# Patient Record
Sex: FEMALE | Race: WHITE | URBAN - METROPOLITAN AREA
[De-identification: names, ages, dates, MRNs, and addresses within clinical notes are randomized per-mention and may not be internally consistent; named-entity substitution may affect disease eponyms.]

---

## 2023-12-15 PROBLEM — O36.5990 PREGNANCY AFFECTED BY FETAL GROWTH RESTRICTION (HHS-HCC): Status: ACTIVE | Noted: 2023-12-15

## 2023-12-17 PROBLEM — O36.5990 PREGNANCY AFFECTED BY FETAL GROWTH RESTRICTION (HHS-HCC): Status: RESOLVED | Noted: 2023-12-15 | Resolved: 2023-12-17

## 2024-08-02 PROCEDURE — 87491 CHLMYD TRACH DNA AMP PROBE: CPT

## 2024-08-02 PROCEDURE — 87591 N.GONORRHOEAE DNA AMP PROB: CPT

## 2024-08-06 ENCOUNTER — LAB REQUISITION (OUTPATIENT)
Dept: LAB | Facility: HOSPITAL | Age: 29
End: 2024-08-06
Payer: COMMERCIAL

## 2024-08-06 DIAGNOSIS — Z11.51 ENCOUNTER FOR SCREENING FOR HUMAN PAPILLOMAVIRUS (HPV): ICD-10-CM

## 2024-08-06 DIAGNOSIS — Z12.4 ENCOUNTER FOR SCREENING FOR MALIGNANT NEOPLASM OF CERVIX: ICD-10-CM

## 2024-08-06 DIAGNOSIS — Z34.81 ENCOUNTER FOR SUPERVISION OF OTHER NORMAL PREGNANCY, FIRST TRIMESTER (HHS-HCC): ICD-10-CM

## 2024-08-07 LAB
C TRACH RRNA SPEC QL NAA+PROBE: NEGATIVE
N GONORRHOEA DNA SPEC QL PROBE+SIG AMP: NEGATIVE

## 2024-08-16 LAB
CYTOLOGY CMNT CVX/VAG CYTO-IMP: NORMAL
LAB AP HPV GENOTYPE QUESTION: YES
LAB AP HPV HR: NORMAL
LAB AP PAP ADDITIONAL TESTS: NORMAL
LABORATORY COMMENT REPORT: NORMAL
MENSTRUAL HX REPORTED: NORMAL
PATH REPORT.TOTAL CANCER: NORMAL

## 2024-10-25 DIAGNOSIS — D64.9 ANEMIA, UNSPECIFIED: ICD-10-CM

## 2024-10-25 DIAGNOSIS — O99.019 ANEMIA COMPLICATING PREGNANCY, UNSPECIFIED TRIMESTER (HHS-HCC): ICD-10-CM

## 2024-10-25 DIAGNOSIS — Z34.82 ENCOUNTER FOR SUPERVISION OF OTHER NORMAL PREGNANCY, SECOND TRIMESTER: Primary | ICD-10-CM

## 2024-11-22 DIAGNOSIS — Z34.83 ENCOUNTER FOR SUPERVISION OF OTHER NORMAL PREGNANCY, THIRD TRIMESTER: Primary | ICD-10-CM

## 2025-03-03 ENCOUNTER — PREP FOR PROCEDURE (OUTPATIENT)
Dept: OBSTETRICS AND GYNECOLOGY | Facility: HOSPITAL | Age: 30
End: 2025-03-03

## 2025-03-03 ENCOUNTER — HOSPITAL ENCOUNTER (OUTPATIENT)
Facility: HOSPITAL | Age: 30
Discharge: HOME | End: 2025-03-03
Payer: COMMERCIAL

## 2025-03-03 VITALS
DIASTOLIC BLOOD PRESSURE: 79 MMHG | RESPIRATION RATE: 16 BRPM | TEMPERATURE: 97.5 F | HEIGHT: 65 IN | WEIGHT: 189.2 LBS | BODY MASS INDEX: 31.52 KG/M2 | SYSTOLIC BLOOD PRESSURE: 121 MMHG | OXYGEN SATURATION: 99 % | HEART RATE: 106 BPM

## 2025-03-03 PROCEDURE — 99213 OFFICE O/P EST LOW 20 MIN: CPT

## 2025-03-03 RX ORDER — ONDANSETRON 4 MG/1
4 TABLET, FILM COATED ORAL EVERY 6 HOURS PRN
Status: DISCONTINUED | OUTPATIENT
Start: 2025-03-03 | End: 2025-03-03 | Stop reason: HOSPADM

## 2025-03-03 RX ORDER — LABETALOL HYDROCHLORIDE 5 MG/ML
20 INJECTION, SOLUTION INTRAVENOUS ONCE AS NEEDED
Status: DISCONTINUED | OUTPATIENT
Start: 2025-03-03 | End: 2025-03-03 | Stop reason: HOSPADM

## 2025-03-03 RX ORDER — LIDOCAINE HYDROCHLORIDE 10 MG/ML
0.5 INJECTION, SOLUTION EPIDURAL; INFILTRATION; INTRACAUDAL; PERINEURAL ONCE AS NEEDED
Status: DISCONTINUED | OUTPATIENT
Start: 2025-03-03 | End: 2025-03-03 | Stop reason: HOSPADM

## 2025-03-03 RX ORDER — ONDANSETRON HYDROCHLORIDE 2 MG/ML
4 INJECTION, SOLUTION INTRAVENOUS EVERY 6 HOURS PRN
Status: DISCONTINUED | OUTPATIENT
Start: 2025-03-03 | End: 2025-03-03 | Stop reason: HOSPADM

## 2025-03-03 RX ORDER — HYDRALAZINE HYDROCHLORIDE 20 MG/ML
5 INJECTION INTRAMUSCULAR; INTRAVENOUS ONCE AS NEEDED
Status: DISCONTINUED | OUTPATIENT
Start: 2025-03-03 | End: 2025-03-03 | Stop reason: HOSPADM

## 2025-03-03 ASSESSMENT — PAIN SCALES - GENERAL: PAINLEVEL_OUTOF10: 7

## 2025-03-03 NOTE — H&P
OB Triage H&P    Assessment/Plan    Rinku De is a 29 y.o.  at 38w6d, AYAH: 3/11/2025, by Other Basis, who presents to triage with pelvic pressure.    SVE unchanged from last office exam, toco quiet. Reassuring fetal status. Patient offered observation and repeat SVE vs discharge home and elects the latter. Patient requesting to schedule risk reducing IOL in the event that she does not spontaneously labor, scheduled tomorrow @ 8AM. Return precautions reviewed.     Plan    -Fetal monitoring reassuring  -Good fetal movement  -Up to date on prenatal care  -Continue routine prenatal care    Dispo  -Patient appropriate for discharge home, agrees with plan  -Return precautions discussed   -Follow up for IOL tomorrow or to triage sooner as needed      Subjective   30yo  at 38w6d presents for increased pelvic pressure. Some ctx. Denies LOF/VB. +fetal movement. SVE 2cm in office 3 days ago. H/o  x1, planning TOLAC.     OB History    Para Term  AB Living   6 4 2 2 1 0   SAB IAB Ectopic Multiple Live Births   1 0 0 0 0      # Outcome Date GA Lbr Devante/2nd Weight Sex Type Anes PTL Lv   6 Current            5 SAB            4             3             2 Term            1 Term                No past surgical history on file.    Social History     Tobacco Use    Smoking status: Not on file    Smokeless tobacco: Not on file   Substance Use Topics    Alcohol use: Not on file       Allergies   Allergen Reactions    Prednisone Agitation       Medications Prior to Admission   Medication Sig Dispense Refill Last Dose/Taking    prenatal no115/iron/folic acid (PRENATAL 19 ORAL) Take by mouth once daily.   Past Week     Objective     Last Vitals  Temp Pulse Resp BP MAP O2 Sat   36.4 °C (97.5 °F) 106 16 121/79 95 99 %     Blood Pressures         3/3/2025  1429             BP: 121/79             Physical Exam  General: NAD, mood appropriate  Cardiopulmonary: warm and well perfused,  breathing comfortably on room air  Abdomen: Gravid, non-tender  Extremities: Symmetric  Speculum Exam: deferred  Cervix: 2 /60 /2     Chaperone Present: Yes.  Chaperone Name/Title: RN  Examination Chaperoned: Entire Physical Exam     Fetal Monitoring  Baseline: 140 bpm, Variability: moderate,  Accelerations: present and Decelerations: none  Uterine Activity: No contractions seen on toco  Interpretation: Category 1      Prenatal labs reviewed, not remarkable.

## 2025-03-04 ENCOUNTER — ANESTHESIA EVENT (OUTPATIENT)
Dept: OBSTETRICS AND GYNECOLOGY | Facility: HOSPITAL | Age: 30
End: 2025-03-04
Payer: COMMERCIAL

## 2025-03-04 ENCOUNTER — HOSPITAL ENCOUNTER (INPATIENT)
Facility: HOSPITAL | Age: 30
LOS: 2 days | Discharge: HOME | End: 2025-03-06
Admitting: STUDENT IN AN ORGANIZED HEALTH CARE EDUCATION/TRAINING PROGRAM
Payer: COMMERCIAL

## 2025-03-04 ENCOUNTER — APPOINTMENT (OUTPATIENT)
Dept: OBSTETRICS AND GYNECOLOGY | Facility: HOSPITAL | Age: 30
End: 2025-03-04
Payer: COMMERCIAL

## 2025-03-04 ENCOUNTER — ANESTHESIA (OUTPATIENT)
Dept: OBSTETRICS AND GYNECOLOGY | Facility: HOSPITAL | Age: 30
End: 2025-03-04
Payer: COMMERCIAL

## 2025-03-04 DIAGNOSIS — Z34.90 TERM PREGNANCY (HHS-HCC): Primary | ICD-10-CM

## 2025-03-04 LAB
ABO GROUP (TYPE) IN BLOOD: NORMAL
ABO GROUP (TYPE) IN BLOOD: NORMAL
ANTIBODY SCREEN: NORMAL
ERYTHROCYTE [DISTWIDTH] IN BLOOD BY AUTOMATED COUNT: 20 % (ref 11.5–14.5)
HCT VFR BLD AUTO: 29.6 % (ref 36–46)
HGB BLD-MCNC: 8.5 G/DL (ref 12–16)
MCH RBC QN AUTO: 20 PG (ref 26–34)
MCHC RBC AUTO-ENTMCNC: 28.7 G/DL (ref 32–36)
MCV RBC AUTO: 70 FL (ref 80–100)
NRBC BLD-RTO: 0 /100 WBCS (ref 0–0)
PLATELET # BLD AUTO: 363 X10*3/UL (ref 150–450)
RBC # BLD AUTO: 4.26 X10*6/UL (ref 4–5.2)
RH FACTOR (ANTIGEN D): NORMAL
RH FACTOR (ANTIGEN D): NORMAL
TREPONEMA PALLIDUM IGG+IGM AB [PRESENCE] IN SERUM OR PLASMA BY IMMUNOASSAY: NONREACTIVE
WBC # BLD AUTO: 8.5 X10*3/UL (ref 4.4–11.3)

## 2025-03-04 PROCEDURE — 1220000001 HC OB SEMI-PRIVATE ROOM DAILY

## 2025-03-04 PROCEDURE — 2500000004 HC RX 250 GENERAL PHARMACY W/ HCPCS (ALT 636 FOR OP/ED): Performed by: NURSE ANESTHETIST, CERTIFIED REGISTERED

## 2025-03-04 PROCEDURE — 59050 FETAL MONITOR W/REPORT: CPT

## 2025-03-04 PROCEDURE — 59409 OBSTETRICAL CARE: CPT

## 2025-03-04 PROCEDURE — 86850 RBC ANTIBODY SCREEN: CPT | Performed by: STUDENT IN AN ORGANIZED HEALTH CARE EDUCATION/TRAINING PROGRAM

## 2025-03-04 PROCEDURE — 3700000014 EPIDURAL BLOCK: Performed by: NURSE ANESTHETIST, CERTIFIED REGISTERED

## 2025-03-04 PROCEDURE — 36415 COLL VENOUS BLD VENIPUNCTURE: CPT | Performed by: STUDENT IN AN ORGANIZED HEALTH CARE EDUCATION/TRAINING PROGRAM

## 2025-03-04 PROCEDURE — 01967 NEURAXL LBR ANES VAG DLVR: CPT | Performed by: NURSE ANESTHETIST, CERTIFIED REGISTERED

## 2025-03-04 PROCEDURE — 2500000004 HC RX 250 GENERAL PHARMACY W/ HCPCS (ALT 636 FOR OP/ED): Performed by: STUDENT IN AN ORGANIZED HEALTH CARE EDUCATION/TRAINING PROGRAM

## 2025-03-04 PROCEDURE — 85027 COMPLETE CBC AUTOMATED: CPT | Performed by: STUDENT IN AN ORGANIZED HEALTH CARE EDUCATION/TRAINING PROGRAM

## 2025-03-04 PROCEDURE — 7210000002 HC LABOR PER HOUR

## 2025-03-04 PROCEDURE — 10907ZC DRAINAGE OF AMNIOTIC FLUID, THERAPEUTIC FROM PRODUCTS OF CONCEPTION, VIA NATURAL OR ARTIFICIAL OPENING: ICD-10-PCS

## 2025-03-04 PROCEDURE — 3E033VJ INTRODUCTION OF OTHER HORMONE INTO PERIPHERAL VEIN, PERCUTANEOUS APPROACH: ICD-10-PCS

## 2025-03-04 PROCEDURE — 86901 BLOOD TYPING SEROLOGIC RH(D): CPT | Performed by: STUDENT IN AN ORGANIZED HEALTH CARE EDUCATION/TRAINING PROGRAM

## 2025-03-04 PROCEDURE — 86780 TREPONEMA PALLIDUM: CPT | Mod: TRILAB | Performed by: STUDENT IN AN ORGANIZED HEALTH CARE EDUCATION/TRAINING PROGRAM

## 2025-03-04 PROCEDURE — 86923 COMPATIBILITY TEST ELECTRIC: CPT

## 2025-03-04 RX ORDER — LOPERAMIDE HYDROCHLORIDE 2 MG/1
4 CAPSULE ORAL EVERY 2 HOUR PRN
Status: DISCONTINUED | OUTPATIENT
Start: 2025-03-04 | End: 2025-03-06 | Stop reason: HOSPADM

## 2025-03-04 RX ORDER — MISOPROSTOL 200 UG/1
800 TABLET ORAL ONCE AS NEEDED
Status: DISCONTINUED | OUTPATIENT
Start: 2025-03-04 | End: 2025-03-06 | Stop reason: HOSPADM

## 2025-03-04 RX ORDER — BISACODYL 10 MG/1
10 SUPPOSITORY RECTAL DAILY PRN
Status: DISCONTINUED | OUTPATIENT
Start: 2025-03-04 | End: 2025-03-06 | Stop reason: HOSPADM

## 2025-03-04 RX ORDER — LABETALOL HYDROCHLORIDE 5 MG/ML
20 INJECTION, SOLUTION INTRAVENOUS ONCE AS NEEDED
Status: DISCONTINUED | OUTPATIENT
Start: 2025-03-04 | End: 2025-03-06 | Stop reason: HOSPADM

## 2025-03-04 RX ORDER — OXYTOCIN 10 [USP'U]/ML
10 INJECTION, SOLUTION INTRAMUSCULAR; INTRAVENOUS ONCE AS NEEDED
Status: DISCONTINUED | OUTPATIENT
Start: 2025-03-04 | End: 2025-03-06 | Stop reason: HOSPADM

## 2025-03-04 RX ORDER — OXYTOCIN/0.9 % SODIUM CHLORIDE 30/500 ML
60 PLASTIC BAG, INJECTION (ML) INTRAVENOUS ONCE AS NEEDED
Status: DISCONTINUED | OUTPATIENT
Start: 2025-03-04 | End: 2025-03-06 | Stop reason: HOSPADM

## 2025-03-04 RX ORDER — METHYLERGONOVINE MALEATE 0.2 MG/ML
0.2 INJECTION INTRAVENOUS ONCE AS NEEDED
Status: DISCONTINUED | OUTPATIENT
Start: 2025-03-04 | End: 2025-03-06 | Stop reason: HOSPADM

## 2025-03-04 RX ORDER — SIMETHICONE 80 MG
80 TABLET,CHEWABLE ORAL 4 TIMES DAILY PRN
Status: DISCONTINUED | OUTPATIENT
Start: 2025-03-04 | End: 2025-03-06 | Stop reason: HOSPADM

## 2025-03-04 RX ORDER — IBUPROFEN 600 MG/1
600 TABLET ORAL EVERY 6 HOURS SCHEDULED
Status: DISCONTINUED | OUTPATIENT
Start: 2025-03-05 | End: 2025-03-06 | Stop reason: HOSPADM

## 2025-03-04 RX ORDER — FENTANYL/ROPIVACAINE/NS/PF 2MCG/ML-.2
0-25 PLASTIC BAG, INJECTION (ML) INJECTION CONTINUOUS
Status: DISCONTINUED | OUTPATIENT
Start: 2025-03-04 | End: 2025-03-06 | Stop reason: HOSPADM

## 2025-03-04 RX ORDER — SODIUM CHLORIDE, SODIUM LACTATE, POTASSIUM CHLORIDE, CALCIUM CHLORIDE 600; 310; 30; 20 MG/100ML; MG/100ML; MG/100ML; MG/100ML
75 INJECTION, SOLUTION INTRAVENOUS CONTINUOUS
Status: ACTIVE | OUTPATIENT
Start: 2025-03-04 | End: 2025-03-05

## 2025-03-04 RX ORDER — ACETAMINOPHEN 325 MG/1
975 TABLET ORAL EVERY 6 HOURS SCHEDULED
Status: DISCONTINUED | OUTPATIENT
Start: 2025-03-05 | End: 2025-03-06 | Stop reason: HOSPADM

## 2025-03-04 RX ORDER — ONDANSETRON 4 MG/1
4 TABLET, FILM COATED ORAL EVERY 6 HOURS PRN
Status: DISCONTINUED | OUTPATIENT
Start: 2025-03-04 | End: 2025-03-06 | Stop reason: HOSPADM

## 2025-03-04 RX ORDER — TERBUTALINE SULFATE 1 MG/ML
0.25 INJECTION SUBCUTANEOUS ONCE AS NEEDED
Status: DISCONTINUED | OUTPATIENT
Start: 2025-03-04 | End: 2025-03-06 | Stop reason: HOSPADM

## 2025-03-04 RX ORDER — LIDOCAINE HYDROCHLORIDE 10 MG/ML
30 INJECTION, SOLUTION INFILTRATION; PERINEURAL ONCE AS NEEDED
Status: DISCONTINUED | OUTPATIENT
Start: 2025-03-04 | End: 2025-03-06 | Stop reason: HOSPADM

## 2025-03-04 RX ORDER — HYDRALAZINE HYDROCHLORIDE 20 MG/ML
5 INJECTION INTRAMUSCULAR; INTRAVENOUS ONCE AS NEEDED
Status: DISCONTINUED | OUTPATIENT
Start: 2025-03-04 | End: 2025-03-06 | Stop reason: HOSPADM

## 2025-03-04 RX ORDER — TRANEXAMIC ACID 10 MG/ML
1000 INJECTION, SOLUTION INTRAVENOUS ONCE AS NEEDED
Status: DISCONTINUED | OUTPATIENT
Start: 2025-03-04 | End: 2025-03-06 | Stop reason: HOSPADM

## 2025-03-04 RX ORDER — CARBOPROST TROMETHAMINE 250 UG/ML
250 INJECTION, SOLUTION INTRAMUSCULAR ONCE AS NEEDED
Status: DISCONTINUED | OUTPATIENT
Start: 2025-03-04 | End: 2025-03-06 | Stop reason: HOSPADM

## 2025-03-04 RX ORDER — OXYTOCIN/0.9 % SODIUM CHLORIDE 30/500 ML
2-30 PLASTIC BAG, INJECTION (ML) INTRAVENOUS CONTINUOUS
Status: DISCONTINUED | OUTPATIENT
Start: 2025-03-04 | End: 2025-03-06 | Stop reason: HOSPADM

## 2025-03-04 RX ORDER — NIFEDIPINE 10 MG/1
10 CAPSULE ORAL ONCE AS NEEDED
Status: DISCONTINUED | OUTPATIENT
Start: 2025-03-04 | End: 2025-03-06 | Stop reason: HOSPADM

## 2025-03-04 RX ORDER — TRANEXAMIC ACID 10 MG/ML
1000 INJECTION, SOLUTION INTRAVENOUS ONCE AS NEEDED
Status: ACTIVE | OUTPATIENT
Start: 2025-03-04 | End: 2025-03-04

## 2025-03-04 RX ORDER — ONDANSETRON HYDROCHLORIDE 2 MG/ML
4 INJECTION, SOLUTION INTRAVENOUS EVERY 6 HOURS PRN
Status: DISCONTINUED | OUTPATIENT
Start: 2025-03-04 | End: 2025-03-06 | Stop reason: HOSPADM

## 2025-03-04 RX ORDER — DOCUSATE SODIUM 100 MG/1
100 CAPSULE, LIQUID FILLED ORAL 2 TIMES DAILY PRN
Status: DISCONTINUED | OUTPATIENT
Start: 2025-03-04 | End: 2025-03-06 | Stop reason: HOSPADM

## 2025-03-04 RX ORDER — ADHESIVE BANDAGE
10 BANDAGE TOPICAL
Status: DISCONTINUED | OUTPATIENT
Start: 2025-03-04 | End: 2025-03-06 | Stop reason: HOSPADM

## 2025-03-04 RX ADMIN — SODIUM CHLORIDE, SODIUM LACTATE, POTASSIUM CHLORIDE, AND CALCIUM CHLORIDE 500 ML: 600; 310; 30; 20 INJECTION, SOLUTION INTRAVENOUS at 17:51

## 2025-03-04 RX ADMIN — Medication 3 ML: at 18:08

## 2025-03-04 RX ADMIN — SODIUM CHLORIDE, SODIUM LACTATE, POTASSIUM CHLORIDE, AND CALCIUM CHLORIDE 75 ML/HR: 600; 310; 30; 20 INJECTION, SOLUTION INTRAVENOUS at 09:05

## 2025-03-04 RX ADMIN — Medication 10 ML/HR: at 18:10

## 2025-03-04 RX ADMIN — Medication 2 MILLI-UNITS/MIN: at 09:38

## 2025-03-04 RX ADMIN — SODIUM CHLORIDE, POTASSIUM CHLORIDE, SODIUM LACTATE AND CALCIUM CHLORIDE 500 ML: 600; 310; 30; 20 INJECTION, SOLUTION INTRAVENOUS at 20:40

## 2025-03-04 RX ADMIN — SODIUM CHLORIDE, SODIUM LACTATE, POTASSIUM CHLORIDE, AND CALCIUM CHLORIDE 75 ML/HR: 600; 310; 30; 20 INJECTION, SOLUTION INTRAVENOUS at 16:12

## 2025-03-04 SDOH — SOCIAL STABILITY: SOCIAL INSECURITY: WITHIN THE LAST YEAR, HAVE YOU BEEN AFRAID OF YOUR PARTNER OR EX-PARTNER?: NO

## 2025-03-04 SDOH — ECONOMIC STABILITY: FOOD INSECURITY: WITHIN THE PAST 12 MONTHS, YOU WORRIED THAT YOUR FOOD WOULD RUN OUT BEFORE YOU GOT THE MONEY TO BUY MORE.: NEVER TRUE

## 2025-03-04 SDOH — ECONOMIC STABILITY: FOOD INSECURITY: WITHIN THE PAST 12 MONTHS, THE FOOD YOU BOUGHT JUST DIDN'T LAST AND YOU DIDN'T HAVE MONEY TO GET MORE.: NEVER TRUE

## 2025-03-04 SDOH — HEALTH STABILITY: MENTAL HEALTH: HAVE YOU USED ANY SUBSTANCES (CANABIS, COCAINE, HEROIN, HALLUCINOGENS, INHALANTS, ETC.) IN THE PAST 12 MONTHS?: NO

## 2025-03-04 SDOH — SOCIAL STABILITY: SOCIAL INSECURITY: WITHIN THE LAST YEAR, HAVE YOU BEEN HUMILIATED OR EMOTIONALLY ABUSED IN OTHER WAYS BY YOUR PARTNER OR EX-PARTNER?: NO

## 2025-03-04 SDOH — SOCIAL STABILITY: SOCIAL INSECURITY
WITHIN THE LAST YEAR, HAVE YOU BEEN KICKED, HIT, SLAPPED, OR OTHERWISE PHYSICALLY HURT BY YOUR PARTNER OR EX-PARTNER?: NO

## 2025-03-04 SDOH — HEALTH STABILITY: MENTAL HEALTH: SUICIDAL BEHAVIOR (LIFETIME): NO

## 2025-03-04 SDOH — HEALTH STABILITY: MENTAL HEALTH: NON-SPECIFIC ACTIVE SUICIDAL THOUGHTS (PAST 1 MONTH): NO

## 2025-03-04 SDOH — SOCIAL STABILITY: SOCIAL INSECURITY: VERBAL ABUSE: DENIES

## 2025-03-04 SDOH — HEALTH STABILITY: MENTAL HEALTH: WISH TO BE DEAD (PAST 1 MONTH): NO

## 2025-03-04 SDOH — ECONOMIC STABILITY: FOOD INSECURITY: HOW HARD IS IT FOR YOU TO PAY FOR THE VERY BASICS LIKE FOOD, HOUSING, MEDICAL CARE, AND HEATING?: NOT HARD AT ALL

## 2025-03-04 SDOH — SOCIAL STABILITY: SOCIAL INSECURITY
WITHIN THE LAST YEAR, HAVE YOU BEEN RAPED OR FORCED TO HAVE ANY KIND OF SEXUAL ACTIVITY BY YOUR PARTNER OR EX-PARTNER?: NO

## 2025-03-04 SDOH — ECONOMIC STABILITY: HOUSING INSECURITY: DO YOU FEEL UNSAFE GOING BACK TO THE PLACE WHERE YOU ARE LIVING?: NO

## 2025-03-04 SDOH — SOCIAL STABILITY: SOCIAL INSECURITY: HAVE YOU HAD ANY THOUGHTS OF HARMING ANYONE ELSE?: NO

## 2025-03-04 SDOH — SOCIAL STABILITY: SOCIAL INSECURITY: HAVE YOU HAD THOUGHTS OF HARMING ANYONE ELSE?: NO

## 2025-03-04 SDOH — SOCIAL STABILITY: SOCIAL INSECURITY: ABUSE SCREEN: ADULT

## 2025-03-04 SDOH — ECONOMIC STABILITY: TRANSPORTATION INSECURITY: IN THE PAST 12 MONTHS, HAS LACK OF TRANSPORTATION KEPT YOU FROM MEDICAL APPOINTMENTS OR FROM GETTING MEDICATIONS?: NO

## 2025-03-04 SDOH — SOCIAL STABILITY: SOCIAL INSECURITY: DO YOU FEEL ANYONE HAS EXPLOITED OR TAKEN ADVANTAGE OF YOU FINANCIALLY OR OF YOUR PERSONAL PROPERTY?: NO

## 2025-03-04 SDOH — SOCIAL STABILITY: SOCIAL INSECURITY: ARE THERE ANY APPARENT SIGNS OF INJURIES/BEHAVIORS THAT COULD BE RELATED TO ABUSE/NEGLECT?: NO

## 2025-03-04 SDOH — SOCIAL STABILITY: SOCIAL INSECURITY: HAS ANYONE EVER THREATENED TO HURT YOUR FAMILY OR YOUR PETS?: NO

## 2025-03-04 SDOH — SOCIAL STABILITY: SOCIAL INSECURITY: DOES ANYONE TRY TO KEEP YOU FROM HAVING/CONTACTING OTHER FRIENDS OR DOING THINGS OUTSIDE YOUR HOME?: NO

## 2025-03-04 SDOH — SOCIAL STABILITY: SOCIAL INSECURITY: ARE YOU OR HAVE YOU BEEN THREATENED OR ABUSED PHYSICALLY, EMOTIONALLY, OR SEXUALLY BY ANYONE?: NO

## 2025-03-04 SDOH — HEALTH STABILITY: MENTAL HEALTH: HAVE YOU USED ANY PRESCRIPTION DRUGS OTHER THAN PRESCRIBED IN THE PAST 12 MONTHS?: NO

## 2025-03-04 SDOH — HEALTH STABILITY: MENTAL HEALTH: WERE YOU ABLE TO COMPLETE ALL THE BEHAVIORAL HEALTH SCREENINGS?: YES

## 2025-03-04 SDOH — SOCIAL STABILITY: SOCIAL INSECURITY: PHYSICAL ABUSE: DENIES

## 2025-03-04 SDOH — HEALTH STABILITY: MENTAL HEALTH: CURRENT SMOKER: 0

## 2025-03-04 ASSESSMENT — PAIN SCALES - GENERAL
PAINLEVEL_OUTOF10: 0 - NO PAIN
PAIN_LEVEL: 0
PAINLEVEL_OUTOF10: 0 - NO PAIN

## 2025-03-04 ASSESSMENT — PATIENT HEALTH QUESTIONNAIRE - PHQ9
1. LITTLE INTEREST OR PLEASURE IN DOING THINGS: NOT AT ALL
2. FEELING DOWN, DEPRESSED OR HOPELESS: NOT AT ALL
SUM OF ALL RESPONSES TO PHQ9 QUESTIONS 1 & 2: 0

## 2025-03-04 ASSESSMENT — LIFESTYLE VARIABLES
SKIP TO QUESTIONS 9-10: 1
HOW OFTEN DO YOU HAVE 6 OR MORE DRINKS ON ONE OCCASION: NEVER
AUDIT-C TOTAL SCORE: 0
HOW MANY STANDARD DRINKS CONTAINING ALCOHOL DO YOU HAVE ON A TYPICAL DAY: PATIENT DOES NOT DRINK
AUDIT-C TOTAL SCORE: 0
HOW OFTEN DO YOU HAVE A DRINK CONTAINING ALCOHOL: NEVER

## 2025-03-04 ASSESSMENT — ACTIVITIES OF DAILY LIVING (ADL)
LACK_OF_TRANSPORTATION: NO
LACK_OF_TRANSPORTATION: NO

## 2025-03-04 NOTE — CARE PLAN
The patient's goals for the shift include      The clinical goals for the shift include safe vaginal delivery of baby boy      Problem: Vaginal Birth or  Section  Goal: Fetal and maternal status remain reassuring during the birth process  Outcome: Progressing  Goal: Prevention of malpresentation/labor dystocia through delivery  Outcome: Progressing  Goal: Demonstrates labor coping techniques through delivery  Outcome: Progressing  Goal: Minimal s/sx of HDP and BP<160/110  Outcome: Progressing  Goal: No s/sx of infection through recovery  Outcome: Progressing  Goal: No s/sx of hemorrhage through recovery  Outcome: Progressing     Problem: Pain - Adult  Goal: Verbalizes/displays adequate comfort level or baseline comfort level  Outcome: Progressing     Problem: Safety - Adult  Goal: Free from fall injury  Outcome: Progressing     Problem: Discharge Planning  Goal: Discharge to home or other facility with appropriate resources  Outcome: Progressing

## 2025-03-04 NOTE — ANESTHESIA PROCEDURE NOTES
Epidural Block    Patient location during procedure: OB  Start time: 3/4/2025 5:55 PM  End time: 3/4/2025 6:13 PM  Reason for block: labor analgesia  Staffing  Performed: CRNA   Authorized by: WESTLEY Garcia    Performed by: WESTLEY Garcia    Preanesthetic Checklist  Completed: patient identified, IV checked, risks and benefits discussed, surgical consent, pre-op evaluation, timeout performed and sterile techniques followed  Block Timeout  RN/Licensed healthcare professional reads aloud to the Anesthesia provider and entire team: Patient identity, procedure with side and site, patient position, and as applicable the availability of implants/special equipment/special requirements.  Patient on coagulant treatment: no  Timeout performed at: 3/4/2025 5:57 PM  Block Placement  Patient position: sitting  Prep: ChloraPrep  Sterility prep: mask, gloves and cap  Sedation level: no sedation  Patient monitoring: heart rate, continuous pulse oximetry and blood pressure  Approach: midline  Local numbing: lidocaine 1% to skin and subcutaneous tissues  Vertebral space: lumbar  Lumbar location: L3-L4  Epidural  Loss of resistance technique: air  Guidance: landmark technique        Needle  Needle type: Tuohy   Needle gauge: 17  Needle length: 10.2 cm  Needle insertion depth: 5 cm  Catheter type: multi-orifice  Catheter at skin depth: 12 cm  Catheter securement method: clear occlusive dressing    Test dose: lidocaine 1.5% with epinephrine 1-to-200,000  Test dose: lidocaine 1.5% with epinephrine 1-to-200,000  Test dose result: no positive test dose    PCEA  Medication concentration used: 0.2% Ropivacaine with 2 mcg/mL Fentanyl  Dose (mL): 5  Lockout (minutes): 20  1-Hour Limit (boluses/hr): 25  Basal Rate: 10        Assessment  Block outcome: pain improved  Number of attempts: 1  Events: no positive test dose  Procedure assessment: patient tolerated procedure well with no immediate complications

## 2025-03-04 NOTE — H&P
OB Admission H&P    Assessment/Plan    Rinku De is a 29 y.o.  at 39w0d, AYAH: 3/11/2025, by Other Basis, who presents for Induction of Labor.    Plan  -Admit to L&D, consented  -Patient desires trial of labor after . Risks, benefits, and alternatives reviewed. MFMU  success calculator = 84%.  -CBC and Syphilis  -Type & cross 2 units  -Epidural at patient request  -Recheck as clinically indicated by maternal or fetal status  -Plan to initiate induction with pitocin    Fetal Status  -NST reactive, reassuring   -Presentation cephalic based on vaginal exam  -EFW 19%ile by US on 25  -GBS negative      Pregnancy Problems (from 25 to present)       Problem Noted Diagnosed Resolved    Term pregnancy ([facility]) 3/4/2025 by Kate Gonzales MD  No    Priority:  Medium               Subjective   28yo  at 39w0d presents for scheduled induction of labor.     OB History    Para Term  AB Living   6 4 2 2 1 5   SAB IAB Ectopic Multiple Live Births   1 0 0 0 4      # Outcome Date GA Lbr Devante/2nd Weight Sex Type Anes PTL Lv   6 Current            5 SAB            4             3             2 Term            1 Term                History reviewed. No pertinent surgical history.    Social History     Tobacco Use    Smoking status: Never    Smokeless tobacco: Never   Substance Use Topics    Alcohol use: Not Currently       Allergies   Allergen Reactions    Prednisone Agitation       Medications Prior to Admission   Medication Sig Dispense Refill Last Dose/Taking    prenatal no115/iron/folic acid (PRENATAL 19 ORAL) Take by mouth once daily.   Past Week     Objective     Last Vitals  Temp Pulse Resp BP MAP O2 Sat   36.4 °C (97.5 °F) 91 16 121/75 93 100 %     Blood Pressures         3/4/2025  0814             BP: 121/75             Physical Exam  General: NAD, mood appropriate  Cardiopulmonary: warm and well perfused, breathing comfortably on room air  Abdomen:  Gravid, non-tender  Extremities: Symmetric  Speculum Exam: deferred  Cervix: 2/60/-2 in triage last night       Fetal Monitoring  Baseline: 140 bpm, Variability: moderate,  Accelerations: present and Decelerations: none  Uterine Activity: No contractions seen on toco  Interpretation: Category 1    Labs in chart were reviewed.

## 2025-03-04 NOTE — ANESTHESIA PREPROCEDURE EVALUATION
Patient: Rinku De    Evaluation Method: In-person visit    Procedure Information    Date: 25  Procedure: Labor Analgesia         Relevant Problems   Anesthesia (within normal limits)      Cardiac (within normal limits)      Pulmonary (within normal limits)      Neuro (within normal limits)      GI (within normal limits)      /Renal (within normal limits)      Liver (within normal limits)      Endocrine (within normal limits)      Hematology (within normal limits)      Musculoskeletal (within normal limits)      HEENT (within normal limits)      ID (within normal limits)      Skin (within normal limits)      GYN (within normal limits)      Gravid and    (+) Term pregnancy ([facility])       Clinical information reviewed:   Tobacco  Allergies  Meds   Med Hx  Surg Hx   Fam Hx          NPO Detail:  NPO/Void Status  Date of Last Liquid: 25  Time of Last Liquid:   Date of Last Solid: 25  Time of Last Solid:          OB/Gyn Evaluation    Present Pregnancy    Patient is pregnant now.  (+) , trial of labor after    Obstetric History                Physical Exam    Airway  Mallampati: II  TM distance: >3 FB     Cardiovascular - normal exam     Dental - normal exam     Pulmonary - normal exam     Abdominal - normal exam             Anesthesia Plan    History of general anesthesia?: no  History of complications of general anesthesia?: no    ASA 2     epidural     The patient is not a current smoker.    Anesthetic plan and risks discussed with patient.  Use of blood products discussed with patient who consented to blood products.

## 2025-03-05 PROCEDURE — 7100000016 HC LABOR RECOVERY PER HOUR

## 2025-03-05 PROCEDURE — 2500000001 HC RX 250 WO HCPCS SELF ADMINISTERED DRUGS (ALT 637 FOR MEDICARE OP)

## 2025-03-05 PROCEDURE — 1220000001 HC OB SEMI-PRIVATE ROOM DAILY

## 2025-03-05 PROCEDURE — 51701 INSERT BLADDER CATHETER: CPT

## 2025-03-05 RX ADMIN — ACETAMINOPHEN 975 MG: 325 TABLET, FILM COATED ORAL at 19:58

## 2025-03-05 RX ADMIN — ACETAMINOPHEN 975 MG: 325 TABLET, FILM COATED ORAL at 05:38

## 2025-03-05 RX ADMIN — IBUPROFEN 600 MG: 600 TABLET, FILM COATED ORAL at 05:38

## 2025-03-05 RX ADMIN — IBUPROFEN 600 MG: 600 TABLET, FILM COATED ORAL at 19:59

## 2025-03-05 ASSESSMENT — PAIN DESCRIPTION - LOCATION
LOCATION: ABDOMEN
LOCATION: ABDOMEN

## 2025-03-05 ASSESSMENT — PAIN SCALES - GENERAL
PAINLEVEL_OUTOF10: 6
PAINLEVEL_OUTOF10: 2
PAINLEVEL_OUTOF10: 0 - NO PAIN
PAINLEVEL_OUTOF10: 0 - NO PAIN
PAINLEVEL_OUTOF10: 6
PAINLEVEL_OUTOF10: 9
PAINLEVEL_OUTOF10: 5 - MODERATE PAIN

## 2025-03-05 NOTE — CARE PLAN
The clinical goals for the shift include pain management    Problem: Pain - Adult  Goal: Verbalizes/displays adequate comfort level or baseline comfort level  Outcome: Progressing     Problem: Safety - Adult  Goal: Free from fall injury  Outcome: Progressing     Problem: Discharge Planning  Goal: Discharge to home or other facility with appropriate resources  Outcome: Progressing     Problem: Vaginal Birth or  Section  Goal: Fetal and maternal status remain reassuring during the birth process  Outcome: Met  Goal: Prevention of malpresentation/labor dystocia through delivery  Outcome: Met  Goal: Demonstrates labor coping techniques through delivery  Outcome: Met  Goal: Minimal s/sx of HDP and BP<160/110  Outcome: Met  Goal: No s/sx of infection through recovery  Outcome: Met  Goal: No s/sx of hemorrhage through recovery  Outcome: Met

## 2025-03-05 NOTE — ANESTHESIA POSTPROCEDURE EVALUATION
Patient: Rinku De    Procedure Summary       Date: 03/04/25 Room / Location:     Anesthesia Start: 1755 Anesthesia Stop: 1952    Procedure: Labor Analgesia Diagnosis:     Scheduled Providers:  Responsible Provider: WESTLEY Garcia    Anesthesia Type: epidural ASA Status: 2            Anesthesia Type: epidural    Vitals Value Taken Time   /78 03/04/25 2216   Temp 36.8 03/04/25 2216   Pulse 91 03/04/25 2216   Resp 16 03/04/25 2216   SpO2 98 03/04/25 2216       Anesthesia Post Evaluation    Patient location during evaluation: bedside  Patient participation: complete - patient participated  Level of consciousness: awake and alert  Pain score: 0  Pain management: adequate  Airway patency: patent  Cardiovascular status: acceptable  Respiratory status: acceptable  Hydration status: acceptable  Postoperative Nausea and Vomiting: none        There were no known notable events for this encounter.

## 2025-03-05 NOTE — LACTATION NOTE
Lactation Consultant Note      Met with mother. Mother is an experienced breastfeeder. Mother  all of her other children. Mother stated breastfeeding is going well and denies any pain. Mother denies needing any help. Mother denies needing any reviews on breastfeeding education. Continuing support offered as needed.

## 2025-03-05 NOTE — L&D DELIVERY NOTE
OB Delivery Note  3/4/2025  Rinku De  29 y.o.   Vaginal, Spontaneous      viable male infant over intact perineum  Spontaneous cry at delivery    Placenta spontaneously delivered, 3VC, intact.  EBL 150cc  Bimanual exploration of uterus - intact    Complications -none    Gestational Age: 39w0d  /Para:   Quantitative Blood Loss: Admission to Discharge: 0 mL (3/4/2025  7:57 AM - 3/4/2025 10:05 PM)    Lennox De [[id number]]      Labor Events    Labor type: Induced Onset of Labor  Labor allowed to proceed with plans for an attempted vaginal birth?: Yes  Induction: Oxytocin  Induction indications: Risk Reducing  Complications: None       Placenta    Placenta delivery date/time:   Placenta removal: Expressed  Placenta appearance: Intact  Placenta disposition: discarded       Cord    Vessels: 3 vessels  Complications: None  Delayed cord clamping?: No  Cord blood disposition: Discarded  Gases sent?: No  Stem cell collection (by provider): No       Lacerations    Episiotomy: None  Perineal laceration: None  Other lacerations?: No  Repair suture: None        Delivery    Birth date/time:   Delivery type: Vaginal, Spontaneous  Complications: None       Apgars    Living status:   Apgar Component Scores:  1 min.:  5 min.:  10 min.:  15 min.:  20 min.:    Skin color:         Heart rate:         Reflex irritability:         Muscle tone:         Respiratory effort:         Total:                Delivery Providers    Delivering clinician:    Provider Role     Delivery Nurse     Nursery Nurse     Resident                 Intrauterine Device Inserted : No,      Jesusita De La Torre MD

## 2025-03-05 NOTE — PROGRESS NOTES
Postpartum Progress Note    Assessment/Plan   Rinku De is a 29 y.o., , who delivered at 39w0d gestation and is now postpartum day 1.    Continue current care    Assessment & Plan  Term pregnancy ([facility])    Pregnancy Problems (from 25 to present)       Problem Noted Diagnosed Resolved    Term pregnancy ([facility]) 3/4/2025 by Kate Gonzales MD  No    Priority:  Medium             Hospital course: no complications      Subjective   Her pain is well controlled with current medications    She is ambulating well  She is tolerating a Adult diet Regular  She reports no breast or nursing problems  She denies emotional concerns today   Her plan for contraception is none         Objective   Allergies:   Prednisone         Last Vitals:  Temp Pulse Resp BP MAP Pulse Ox   36.5 °C (97.7 °F) (!) 50 20 118/67 86 100 %     Vitals Min/Max Last 24 Hours:  Temp  Min: 36.5 °C (97.7 °F)  Max: 37.5 °C (99.5 °F)  Pulse  Min: 47  Max: 110  Resp  Min: 16  Max: 20  BP  Min: 97/60  Max: 126/81  MAP (mmHg)  Min: 73  Max: 102    Intake/Output:     Intake/Output Summary (Last 24 hours) at 3/5/2025 0828  Last data filed at 3/5/2025 0459  Gross per 24 hour   Intake 1047 ml   Output 1055 ml   Net -8 ml       Physical Exam:  General: Examination reveals a well developed, well nourished, female, in no acute distress. She is alert and cooperative.  Abdomen: soft, gravid, nontender, nondistended, no abnormal masses, no epigastric pain.  Fundus: firm and below umbilicus.  Perineum: minimal bleeding.  Extremities: no redness or tenderness in the calves or thighs.  Neurological: alert, oriented, normal speech, no focal findings or movement disorder noted.  Psychological: awake and alert; oriented to person, place, and time.    Chaperone Present: Declined.  Chaperone Name/Title:   Examination Chaperoned:     Lab Data:

## 2025-03-05 NOTE — PROGRESS NOTES
Intrapartum Progress Note    Assessment/Plan   Rinku De is a 29 y.o.  at 39w0d. AYAH: 3/11/2025, by Other Basis.     Amniotomy - no complications  Continue pitocin per protocol    Assessment & Plan  Term pregnancy ([facility])    Pregnancy Problems (from 25 to present)       Problem Noted Diagnosed Resolved    Term pregnancy ([facility]) 3/4/2025 by Kate Gonzales MD  No    Priority:  Medium               Subjective   Comfortable with epidural    Objective   Last Vitals:  Temp Pulse Resp BP MAP Pulse Ox   37.1 °C (98.8 °F) 91 16 97/60 73 100 %     Vitals Min/Max Last 24 Hours:  Temp  Min: 36.4 °C (97.5 °F)  Max: 37.5 °C (99.5 °F)  Pulse  Min: 65  Max: 93  Resp  Min: 16  Max: 16  BP  Min: 97/60  Max: 126/79  MAP (mmHg)  Min: 73  Max: 102    Intake/Output:    Intake/Output Summary (Last 24 hours) at 3/4/2025 2018  Last data filed at 3/4/2025 1612  Gross per 24 hour   Intake 1047 ml   Output 100 ml   Net 947 ml       Physical Examination:  GENERAL: Examination reveals a well developed, well nourished, gravid female in no acute distress. She is alert and cooperative.  FHR is normal , with accels, and a cat 1  tracing.    Obetz reading: +contractions, pitocin at 6mu/min (prior 14mu/min but tachsystole - increase pitocin per protocol now   CERVIX: 4 cm dilated, 80 % effaced, -2 station; MEMBRANES are Intact    Chaperone Present: Declined.  Chaperone Name/Title:   Examination Chaperoned:     Lab Review:  Lab Results   Component Value Date    ABO B 2025    LABRH POS 2025    ABSCRN NEG 2025     Lab Results   Component Value Date    WBC 8.5 2025    HGB 8.5 (L) 2025    HCT 29.6 (L) 2025     2025

## 2025-03-06 ENCOUNTER — PHARMACY VISIT (OUTPATIENT)
Dept: PHARMACY | Facility: CLINIC | Age: 30
End: 2025-03-06
Payer: MEDICAID

## 2025-03-06 VITALS
DIASTOLIC BLOOD PRESSURE: 78 MMHG | TEMPERATURE: 97.3 F | WEIGHT: 187 LBS | HEART RATE: 84 BPM | OXYGEN SATURATION: 98 % | RESPIRATION RATE: 16 BRPM | HEIGHT: 65 IN | BODY MASS INDEX: 31.16 KG/M2 | SYSTOLIC BLOOD PRESSURE: 114 MMHG

## 2025-03-06 PROCEDURE — RXMED WILLOW AMBULATORY MEDICATION CHARGE

## 2025-03-06 PROCEDURE — 2500000001 HC RX 250 WO HCPCS SELF ADMINISTERED DRUGS (ALT 637 FOR MEDICARE OP)

## 2025-03-06 PROCEDURE — 2500000001 HC RX 250 WO HCPCS SELF ADMINISTERED DRUGS (ALT 637 FOR MEDICARE OP): Performed by: STUDENT IN AN ORGANIZED HEALTH CARE EDUCATION/TRAINING PROGRAM

## 2025-03-06 RX ORDER — ACETAMINOPHEN 325 MG/1
975 TABLET ORAL EVERY 6 HOURS SCHEDULED
Qty: 30 TABLET | Refills: 0 | Status: SHIPPED | OUTPATIENT
Start: 2025-03-06

## 2025-03-06 RX ORDER — FERROUS SULFATE 325(65) MG
65 TABLET ORAL
Qty: 30 TABLET | Refills: 0 | Status: SHIPPED | OUTPATIENT
Start: 2025-03-06

## 2025-03-06 RX ORDER — DOCUSATE SODIUM 100 MG/1
100 CAPSULE, LIQUID FILLED ORAL 2 TIMES DAILY PRN
Qty: 20 CAPSULE | Refills: 0 | Status: SHIPPED | OUTPATIENT
Start: 2025-03-06

## 2025-03-06 RX ORDER — IBUPROFEN 600 MG/1
600 TABLET ORAL 4 TIMES DAILY PRN
Qty: 90 TABLET | Refills: 0 | Status: SHIPPED | OUTPATIENT
Start: 2025-03-06

## 2025-03-06 RX ORDER — FERROUS SULFATE 325(65) MG
65 TABLET ORAL
Status: DISCONTINUED | OUTPATIENT
Start: 2025-03-06 | End: 2025-03-06 | Stop reason: HOSPADM

## 2025-03-06 RX ADMIN — ACETAMINOPHEN 975 MG: 325 TABLET, FILM COATED ORAL at 10:31

## 2025-03-06 RX ADMIN — IBUPROFEN 600 MG: 600 TABLET, FILM COATED ORAL at 10:31

## 2025-03-06 RX ADMIN — ACETAMINOPHEN 975 MG: 325 TABLET, FILM COATED ORAL at 04:12

## 2025-03-06 RX ADMIN — IBUPROFEN 600 MG: 600 TABLET, FILM COATED ORAL at 04:12

## 2025-03-06 RX ADMIN — FERROUS SULFATE TAB 325 MG (65 MG ELEMENTAL FE) 325 MG: 325 (65 FE) TAB at 10:31

## 2025-03-06 ASSESSMENT — PAIN SCALES - GENERAL
PAINLEVEL_OUTOF10: 0 - NO PAIN
PAINLEVEL_OUTOF10: 0 - NO PAIN

## 2025-03-06 NOTE — PROGRESS NOTES
Postpartum Progress Note    Assessment/Plan   Rinku De is a 29 y.o., , who delivered at 39w0d gestation and is now postpartum day 2.    Postpartum care:  PPD 2 s/p . VSS, recovering well  -Routine Postpartum care  -PRN PO pain control  -Encourage out of bed, deep breathing, PO intake  -Declines  circumcision  -OK for discharge home today      Assessment & Plan  Term pregnancy ([facility])    Pregnancy Problems (from 25 to present)       Problem Noted Diagnosed Resolved    Term pregnancy ([facility]) 3/4/2025 by Kate Gonzales MD  No    Priority:  Medium             Hospital course: no complications    Subjective   Her pain is well controlled with current medications  She is passing flatus  She is ambulating well  She is tolerating a Adult diet Regular  She reports no breast or nursing problems  She denies emotional concerns today   Her plan for contraception is undecided     Pt recovering well after vaginal delivery. Urinating, ambulating, tolerating PO. Vaginal bleeding less than menses. Denies Headache, Chest pain, SOB, nausea, vomiting, RUQ pain, or dizziness.      Objective   Allergies:   Prednisone         Last Vitals:  Temp Pulse Resp BP MAP Pulse Ox   37 °C (98.6 °F) 84 16 114/78 93 97 %     Vitals Min/Max Last 24 Hours:  Temp  Min: 36.7 °C (98.1 °F)  Max: 37 °C (98.6 °F)  Pulse  Min: 81  Max: 90  Resp  Min: 16  Max: 18  BP  Min: 110/66  Max: 134/82  MAP (mmHg)  Min: 81  Max: 101    Intake/Output:   No intake or output data in the 24 hours ending 25 0848    Physical Exam:  General: Examination reveals a well developed, well nourished, female, in no acute distress. She is alert and cooperative.  Fundus: firm.  Extremities: no redness or tenderness in the calves or thighs, edema 1+.      Lab Data:  Lab Results   Component Value Date    WBC 8.5 2025    HGB 8.5 (L) 2025    HCT 29.6 (L) 2025     2025

## 2025-03-06 NOTE — DISCHARGE SUMMARY
Discharge Summary    Admission Date: 3/4/2025  Discharge Date: 03/06/25      Discharge Diagnosis  Term pregnancy ([facility])    Hospital Course  Delivery Date: 3/4/2025 9:52 PM  Delivery type: Vaginal, Spontaneous   GA at delivery: 39w0d  Outcome: Living  Anesthesia during delivery: Epidural  Intrapartum complications: None  Feeding method: Breastfeeding Status: Yes     Procedures: none  Contraception at discharge: undecided      Pertinent Physical Exam At Time of Discharge    General: Examination reveals a well developed, well nourished, female, in no acute distress. She is alert and cooperative.  Lungs: clear to auscultation bilaterally.  Cardiac: regular rate and rhythm, S1, S2 normal, no murmur, click, rub or gallop.  Fundus: firm.  Extremities: no redness or tenderness in the calves or thighs, edema 1+.    Last Vitals:  Temp Pulse Resp BP MAP Pulse Ox   37 °C (98.6 °F) 84 16 114/78 93 97 %     Discharge Meds     Your medication list        START taking these medications        Instructions Last Dose Given Next Dose Due   acetaminophen 325 mg tablet  Commonly known as: Tylenol      Take 3 tablets (975 mg) by mouth every 6 hours.       docusate sodium 100 mg capsule  Commonly known as: Colace      Take 1 capsule (100 mg) by mouth 2 times a day as needed for constipation.       ferrous sulfate tablet      Take 1 tablet (325 mg) by mouth once daily with breakfast.       ibuprofen 600 mg tablet      Take 1 tablet (600 mg) by mouth 4 times a day as needed for mild pain (1 - 3) (pain).              CONTINUE taking these medications        Instructions Last Dose Given Next Dose Due   PRENATAL 19 ORAL                     Where to Get Your Medications        These medications were sent to [facility]  [address], [address], [place] [facility] [id number]      Hours: 9 AM to 6 PM Mon-Fri, 9 AM to 1 PM Sat Phone: [phone]   acetaminophen 325 mg tablet  docusate sodium 100 mg capsule  ferrous sulfate  tablet  ibuprofen 600 mg tablet          Complications Requiring Follow-Up  6 weeks postpartum visit    Test Results Pending At Discharge  Pending Labs       No current pending labs.            Outpatient Follow-Up  No future appointments.    I spent 20 minutes in the professional and overall care of this patient.      Nanette Finnegan MD

## 2025-03-06 NOTE — CARE PLAN
The patient's goals for the shift include rest    The clinical goals for the shift include pain management      Problem: Safety - Adult  Goal: Free from fall injury  Outcome: Met     Problem: Discharge Planning  Goal: Discharge to home or other facility with appropriate resources  Outcome: Met

## 2025-03-06 NOTE — LACTATION NOTE
Lactation Consultant Note      Met with mother. Mother states breastfeeding is still going well and denies needing anything. Mother has no questions or concerns at this time. Continuing support offered as needed.     Plan: Continue to offer both breasts every feed at least 10 times a day. Call lactation services with any questions or concerns at [phone].

## 2025-03-06 NOTE — CARE PLAN
The patient's goals for the shift include vitals and assessments wnl    The clinical goals for the shift include vitals and assessments WNL    Over the shift, the patient met the above goals.       Problem: Safety - Adult  Goal: Free from fall injury  Outcome: Progressing     Problem: Discharge Planning  Goal: Discharge to home or other facility with appropriate resources  Outcome: Progressing     Problem: Pain - Adult  Goal: Verbalizes/displays adequate comfort level or baseline comfort level  Outcome: Met

## 2025-03-08 LAB
BLOOD EXPIRATION DATE: NORMAL
BLOOD EXPIRATION DATE: NORMAL
DISPENSE STATUS: NORMAL
DISPENSE STATUS: NORMAL
PRODUCT BLOOD TYPE: 5100
PRODUCT BLOOD TYPE: 5100
PRODUCT CODE: NORMAL
PRODUCT CODE: NORMAL
UNIT ABO: NORMAL
UNIT ABO: NORMAL
UNIT NUMBER: NORMAL
UNIT NUMBER: NORMAL
UNIT RH: NORMAL
UNIT RH: NORMAL
UNIT VOLUME: 350
UNIT VOLUME: 350
XM INTEP: NORMAL
XM INTEP: NORMAL

## 2025-03-31 RX ORDER — AZITHROMYCIN 250 MG/1
TABLET, FILM COATED ORAL
COMMUNITY
Start: 2024-05-17

## 2025-03-31 RX ORDER — CEPHALEXIN 500 MG/1
1 CAPSULE ORAL
COMMUNITY
Start: 2025-02-17

## 2025-03-31 NOTE — PROGRESS NOTES
[facility]: GENERAL SURGERY  PROGRESS NOTE        ASSESSMENT / PLAN:  Diagnoses and all orders for this visit:  Umbilical hernia without obstruction and without gangrene  Ulcerative colitis with complication, unspecified location (Multi)  Given history of UC, recommend following up with GI and surveillance colonoscopy preop, she would prefer to proceed with surgery first and follow up after. Reasonable given lack of GI symptoms.    Small umbilical hernia, recommend open repair with possible mesh depending on final defect size.   Umbilical hernia can be port site used for tubal ligation.   Discussed likely need to pump and dump breast milk after anesthesia, up to gynecology.     Risks of hernia surgery were discussed including, bleeding, infection (mesh or incision sites), damage to surrounding structures, seroma, numbness, chronic postoperative pain/discomfort/numbness, mesh erosion or fistulization, hernia recurrence, issues related to anesthesia,        Patient Active Problem List   Diagnosis    Term pregnancy ([facility])     Paula De is a 29 y.o. female that is presenting today for consult for surgery laparoscopic hernia repair with gyn laparoscopic with Dr. De La Torre and Dr. Mahoney.   Had 6th kid last month, hernia present for over 5 years, bigger after pregnancy.  Prior c section.   C scope about 3 years ago - diagnosed with UC and biologic was recommended.     Review of Systems   All other systems reviewed and are negative.     Objective   Vitals:    04/04/25 1144   BP: 117/79   Pulse: 69   Temp: 36.7 °C (98 °F)      Physical Exam  Constitutional:       Appearance: Normal appearance.   HENT:      Head: Normocephalic.   Cardiovascular:      Rate and Rhythm: Normal rate and regular rhythm.      Pulses: Normal pulses.   Pulmonary:      Effort: Pulmonary effort is normal.   Abdominal:      General: Bowel sounds are normal.      Palpations: Abdomen is soft.      Comments: Small about  "2cm umbilical defect with fairly moderate sized fat containing hernia sac.   Musculoskeletal:         General: Normal range of motion.   Skin:     General: Skin is warm.   Neurological:      General: No focal deficit present.      Mental Status: She is alert.   Psychiatric:         Mood and Affect: Mood normal.         Behavior: Behavior normal.         Diagnostic Results   No results found for: \"GLUCOSE\", \"CALCIUM\", \"NA\", \"K\", \"CO2\", \"CL\", \"BUN\", \"CREATININE\"  No results found for: \"ALT\", \"AST\", \"GGT\", \"ALKPHOS\", \"BILITOT\"  Lab Results   Component Value Date    WBC 8.5 2025    HGB 8.5 (L) 2025    HCT 29.6 (L) 2025    MCV 70 (L) 2025     2025     No results found for: \"CHOL\"  No results found for: \"HDL\"  No results found for: \"LDLCALC\"  No results found for: \"TRIG\"  No components found for: \"CHOLHDL\"  No results found for: \"HGBA1C\"  Other labs not included in the list above were reviewed either before or during this encounter.    History    Past Medical History:   Diagnosis Date    Anemia      Past Surgical History:   Procedure Laterality Date     SECTION, LOW TRANSVERSE       Family History   Problem Relation Name Age of Onset    No Known Problems Mother      No Known Problems Father      No Known Problems Brother       Allergies   Allergen Reactions    Prednisone Agitation     Current Outpatient Medications on File Prior to Visit   Medication Sig Dispense Refill    azithromycin (Zithromax) 250 mg tablet TAKE 2 TABLETS BY MOUTH TODAY, THEN TAKE 1 TABLET DAILY FOR 4 DAYS AS DIRECTED      cephalexin (Keflex) 500 mg capsule Take 1 capsule (500 mg) by mouth every 12 hours.      acetaminophen (Tylenol) 325 mg tablet Take 3 tablets (975 mg) by mouth every 6 hours. 30 tablet 0    docusate sodium (Colace) 100 mg capsule Take 1 capsule (100 mg) by mouth 2 times a day as needed for constipation. 20 capsule 0    ferrous sulfate tablet Take 1 tablet (325 mg) by mouth once daily " with breakfast. 30 tablet 0    ibuprofen 600 mg tablet Take 1 tablet (600 mg) by mouth 4 times a day as needed for mild pain (1 - 3) (pain). 90 tablet 0    PNV 3-IRON FUM,GLUC-FOLIC ACID ORAL PNV      prenatal no115/iron/folic acid (PRENATAL 19 ORAL) Take by mouth once daily.       No current facility-administered medications on file prior to visit.       There is no immunization history on file for this patient.  Patient's medical history was reviewed and updated either before or during this encounter.    Chris Mahoney MD

## 2025-04-04 ENCOUNTER — PREP FOR PROCEDURE (OUTPATIENT)
Dept: SURGERY | Facility: CLINIC | Age: 30
End: 2025-04-04

## 2025-04-04 ENCOUNTER — OFFICE VISIT (OUTPATIENT)
Dept: SURGERY | Facility: CLINIC | Age: 30
End: 2025-04-04
Payer: COMMERCIAL

## 2025-04-04 VITALS
BODY MASS INDEX: 29.99 KG/M2 | DIASTOLIC BLOOD PRESSURE: 79 MMHG | HEART RATE: 69 BPM | SYSTOLIC BLOOD PRESSURE: 117 MMHG | TEMPERATURE: 98 F | HEIGHT: 65 IN | WEIGHT: 180 LBS

## 2025-04-04 DIAGNOSIS — K51.919 ULCERATIVE COLITIS WITH COMPLICATION, UNSPECIFIED LOCATION (MULTI): ICD-10-CM

## 2025-04-04 DIAGNOSIS — K42.9 UMBILICAL HERNIA WITHOUT OBSTRUCTION AND WITHOUT GANGRENE: Primary | ICD-10-CM

## 2025-04-04 PROCEDURE — 99204 OFFICE O/P NEW MOD 45 MIN: CPT | Performed by: SURGERY

## 2025-04-04 PROCEDURE — 1036F TOBACCO NON-USER: CPT | Performed by: SURGERY

## 2025-04-04 PROCEDURE — 3008F BODY MASS INDEX DOCD: CPT | Performed by: SURGERY

## 2025-04-04 PROCEDURE — 99214 OFFICE O/P EST MOD 30 MIN: CPT | Performed by: SURGERY

## 2025-04-04 ASSESSMENT — COLUMBIA-SUICIDE SEVERITY RATING SCALE - C-SSRS
2. HAVE YOU ACTUALLY HAD ANY THOUGHTS OF KILLING YOURSELF?: NO
6. HAVE YOU EVER DONE ANYTHING, STARTED TO DO ANYTHING, OR PREPARED TO DO ANYTHING TO END YOUR LIFE?: NO
1. IN THE PAST MONTH, HAVE YOU WISHED YOU WERE DEAD OR WISHED YOU COULD GO TO SLEEP AND NOT WAKE UP?: NO

## 2025-04-04 ASSESSMENT — ENCOUNTER SYMPTOMS
OCCASIONAL FEELINGS OF UNSTEADINESS: 0
DEPRESSION: 0
LOSS OF SENSATION IN FEET: 0

## 2025-04-04 ASSESSMENT — PAIN SCALES - GENERAL: PAINLEVEL_OUTOF10: 0-NO PAIN

## 2025-04-04 NOTE — LETTER
April 4, 2025     Jesusita De La Torre MD  [address]  [address]  [place] [address]    Patient: Rinku De   YOB: 1995   Date of Visit: 4/4/2025       Dear Dr. Jesusita De La Torre MD:    Thank you for referring Rinku De to me for evaluation. Below are my notes for this consultation.  If you have questions, please do not hesitate to call me. I look forward to following your patient along with you.  I put my case request in, ok to schedule at your convenience.        Sincerely,     Chris Mahoney MD      CC: No Recipients  ______________________________________________________________________________________    [facility]: GENERAL SURGERY  PROGRESS NOTE        ASSESSMENT / PLAN:  Diagnoses and all orders for this visit:  Umbilical hernia without obstruction and without gangrene  Ulcerative colitis with complication, unspecified location (Multi)  Given history of UC, recommend following up with GI and surveillance colonoscopy preop, she would prefer to proceed with surgery first and follow up after. Reasonable given lack of GI symptoms.    Small umbilical hernia, recommend open repair with possible mesh depending on final defect size.   Umbilical hernia can be port site used for tubal ligation.   Discussed likely need to pump and dump breast milk after anesthesia, up to gynecology.     Risks of hernia surgery were discussed including, bleeding, infection (mesh or incision sites), damage to surrounding structures, seroma, numbness, chronic postoperative pain/discomfort/numbness, mesh erosion or fistulization, hernia recurrence, issues related to anesthesia,        Patient Active Problem List   Diagnosis   • Term pregnancy ([facility])     Subjective  Rinku De is a 29 y.o. female that is presenting today for consult for surgery laparoscopic hernia repair with gyn laparoscopic with Dr. De La Torre and Dr. Mahoney.   Had 6th kid last month, hernia present for over 5 years, bigger  "after pregnancy.  Prior c section.   C scope about 3 years ago - diagnosed with UC and biologic was recommended.     Review of Systems   All other systems reviewed and are negative.     Objective  Vitals:    25 1144   BP: 117/79   Pulse: 69   Temp: 36.7 °C (98 °F)      Physical Exam  Constitutional:       Appearance: Normal appearance.   HENT:      Head: Normocephalic.   Cardiovascular:      Rate and Rhythm: Normal rate and regular rhythm.      Pulses: Normal pulses.   Pulmonary:      Effort: Pulmonary effort is normal.   Abdominal:      General: Bowel sounds are normal.      Palpations: Abdomen is soft.      Comments: Small about 2cm umbilical defect with fairly moderate sized fat containing hernia sac.   Musculoskeletal:         General: Normal range of motion.   Skin:     General: Skin is warm.   Neurological:      General: No focal deficit present.      Mental Status: She is alert.   Psychiatric:         Mood and Affect: Mood normal.         Behavior: Behavior normal.         Diagnostic Results  No results found for: \"GLUCOSE\", \"CALCIUM\", \"NA\", \"K\", \"CO2\", \"CL\", \"BUN\", \"CREATININE\"  No results found for: \"ALT\", \"AST\", \"GGT\", \"ALKPHOS\", \"BILITOT\"  Lab Results   Component Value Date    WBC 8.5 2025    HGB 8.5 (L) 2025    HCT 29.6 (L) 2025    MCV 70 (L) 2025     2025     No results found for: \"CHOL\"  No results found for: \"HDL\"  No results found for: \"LDLCALC\"  No results found for: \"TRIG\"  No components found for: \"CHOLHDL\"  No results found for: \"HGBA1C\"  Other labs not included in the list above were reviewed either before or during this encounter.    History   Past Medical History:   Diagnosis Date   • Anemia      Past Surgical History:   Procedure Laterality Date   •  SECTION, LOW TRANSVERSE       Family History   Problem Relation Name Age of Onset   • No Known Problems Mother     • No Known Problems Father     • No Known Problems Brother       Allergies "   Allergen Reactions   • Prednisone Agitation     Current Outpatient Medications on File Prior to Visit   Medication Sig Dispense Refill   • azithromycin (Zithromax) 250 mg tablet TAKE 2 TABLETS BY MOUTH TODAY, THEN TAKE 1 TABLET DAILY FOR 4 DAYS AS DIRECTED     • cephalexin (Keflex) 500 mg capsule Take 1 capsule (500 mg) by mouth every 12 hours.     • acetaminophen (Tylenol) 325 mg tablet Take 3 tablets (975 mg) by mouth every 6 hours. 30 tablet 0   • docusate sodium (Colace) 100 mg capsule Take 1 capsule (100 mg) by mouth 2 times a day as needed for constipation. 20 capsule 0   • ferrous sulfate tablet Take 1 tablet (325 mg) by mouth once daily with breakfast. 30 tablet 0   • ibuprofen 600 mg tablet Take 1 tablet (600 mg) by mouth 4 times a day as needed for mild pain (1 - 3) (pain). 90 tablet 0   • PNV 3-IRON FUM,GLUC-FOLIC ACID ORAL PNV     • prenatal no115/iron/folic acid (PRENATAL 19 ORAL) Take by mouth once daily.       No current facility-administered medications on file prior to visit.       There is no immunization history on file for this patient.  Patient's medical history was reviewed and updated either before or during this encounter.    Chris Mahoney MD

## 2025-04-21 ENCOUNTER — TELEPHONE (OUTPATIENT)
Dept: SURGERY | Facility: CLINIC | Age: 30
End: 2025-04-21
Payer: COMMERCIAL

## 2025-04-21 PROBLEM — K42.9 UMBILICAL HERNIA WITHOUT OBSTRUCTION AND WITHOUT GANGRENE: Status: ACTIVE | Noted: 2025-04-04

## 2025-04-21 NOTE — LETTER
DATE OF PROCEDURE:  6/05/25 at [facility] with Dr. Mahoney  ([address] [place] [address]) Arrive at Main Entrance, Take elevator to 2nd Floor    Please call the [facility] the day before procedure in between 2pm- 4pm for arrival time: [phone]    The facility will contact you to schedule a pre-admission testing appointment, if needed.        This appointment can be scheduled at either our [facility] or [facility].  [facility]  [address]                [address]  [place], [place] [address]   [place], [place] [address]  P: [phone]               P: [phone]    POST-OP APPT:  6/20/25 @ 9:45 am at [place] office ([address]) with Dr. Mahoney      You Will Need a Responsible Adult to Transport you From the Facility. No Taxi, Uber, or [employer] will be   permitted to transport you without a responsible adult to care for you.          PRE-OPERATIVE INSTRUCTIONS:    NOTHING BY MOUTH AFTER MIDNIGHT THE NIGHT BEFORE SURGERY  THIS INCLUDES LIQUIDS, FOOD, CANDY, GUM AND CHEWING TOBACCO     NO BLOOD THINNERS 5 DAYS BEFORE,   EXAMPLES include:   Over the counter medicines such as Aspirin, Advil, Motrin, Ibuprofen, etc.  CONSULT YOUR DOCTOR REGARDING ANY PRECRIBED BLOOD THINNERS SUCH AS COUMADIN, PLAVIX, XARELTO AND ELIQUIS)     DIABETICS: Take Only ½ the dose of your insulin the day before, NO insulin or oral diabetic medications the morning of procedure.   HEART MEDICATION: It's ok to take Heart Medications and/or blood pressure medications the day before your procedure with a sip of water.     DO NOT:  SMOKE AFTER MIDNIGHT THE NIGHT BEFORE SURGERY    DRINK ALCOHOL 24 HOURS PRIOR TO SURGERY    SHAVE NEAR SURGICAL SITE FOR 1 WEEK PRIOR TO PROCEDURE.    BRING JEWELRY OR VALUABLES THE DAY OF SURGERY.     PLEASE DO:    REMOVE ALL MAKEUP AND NAIL POLISH.   BRING A LIST OF MEDICATIONS YOU ARE TAKING WITH YOU.   WEAR  LOOSE COMFORTABLE CLOTHES AND LOW-HEELED SHOES SO THAT IT WILL BE EASIER TO DRESS AFTER SURGERY

## 2025-04-21 NOTE — TELEPHONE ENCOUNTER
Patient is scheduled for surgery with Dr. Mahoney & Dr. De La Torre on 6/05/25 at [facility]. A post op appt with Dr. Mahoney at the [place] office on 6/20/25 @ 9:45 am, itinerary mailed.

## 2025-05-01 ENCOUNTER — PREP FOR PROCEDURE (OUTPATIENT)
Dept: OBSTETRICS AND GYNECOLOGY | Facility: HOSPITAL | Age: 30
End: 2025-05-01
Payer: COMMERCIAL

## 2025-05-01 RX ORDER — SODIUM CHLORIDE, SODIUM LACTATE, POTASSIUM CHLORIDE, CALCIUM CHLORIDE 600; 310; 30; 20 MG/100ML; MG/100ML; MG/100ML; MG/100ML
20 INJECTION, SOLUTION INTRAVENOUS CONTINUOUS
OUTPATIENT
Start: 2025-05-01 | End: 2025-05-02

## 2025-05-01 RX ORDER — CEFAZOLIN SODIUM 2 G/50ML
2 SOLUTION INTRAVENOUS ONCE
OUTPATIENT
Start: 2025-05-01 | End: 2025-05-01

## 2025-05-12 ENCOUNTER — OFFICE VISIT (OUTPATIENT)
Dept: SURGERY | Facility: CLINIC | Age: 30
End: 2025-05-12
Payer: COMMERCIAL

## 2025-05-12 VITALS
WEIGHT: 170.2 LBS | HEART RATE: 79 BPM | BODY MASS INDEX: 28.36 KG/M2 | OXYGEN SATURATION: 100 % | TEMPERATURE: 97.9 F | HEIGHT: 65 IN | DIASTOLIC BLOOD PRESSURE: 74 MMHG | SYSTOLIC BLOOD PRESSURE: 116 MMHG | RESPIRATION RATE: 17 BRPM

## 2025-05-12 DIAGNOSIS — K42.9 UMBILICAL HERNIA WITHOUT OBSTRUCTION AND WITHOUT GANGRENE: Primary | ICD-10-CM

## 2025-05-12 PROCEDURE — 3008F BODY MASS INDEX DOCD: CPT | Performed by: SURGERY

## 2025-05-12 PROCEDURE — 1036F TOBACCO NON-USER: CPT | Performed by: SURGERY

## 2025-05-12 PROCEDURE — 99204 OFFICE O/P NEW MOD 45 MIN: CPT | Performed by: SURGERY

## 2025-05-12 PROCEDURE — 99214 OFFICE O/P EST MOD 30 MIN: CPT | Performed by: SURGERY

## 2025-05-12 RX ORDER — CEFAZOLIN SODIUM 2 G/100ML
2 INJECTION, SOLUTION INTRAVENOUS ONCE
OUTPATIENT
Start: 2025-05-12 | End: 2025-05-12

## 2025-05-12 ASSESSMENT — COLUMBIA-SUICIDE SEVERITY RATING SCALE - C-SSRS
1. IN THE PAST MONTH, HAVE YOU WISHED YOU WERE DEAD OR WISHED YOU COULD GO TO SLEEP AND NOT WAKE UP?: NO
6. HAVE YOU EVER DONE ANYTHING, STARTED TO DO ANYTHING, OR PREPARED TO DO ANYTHING TO END YOUR LIFE?: NO
2. HAVE YOU ACTUALLY HAD ANY THOUGHTS OF KILLING YOURSELF?: NO

## 2025-05-12 ASSESSMENT — ENCOUNTER SYMPTOMS
LOSS OF SENSATION IN FEET: 0
DEPRESSION: 0
OCCASIONAL FEELINGS OF UNSTEADINESS: 0

## 2025-05-12 ASSESSMENT — LIFESTYLE VARIABLES
AUDIT-C TOTAL SCORE: 0
HOW OFTEN DO YOU HAVE SIX OR MORE DRINKS ON ONE OCCASION: NEVER
HOW OFTEN DO YOU HAVE A DRINK CONTAINING ALCOHOL: NEVER
HOW MANY STANDARD DRINKS CONTAINING ALCOHOL DO YOU HAVE ON A TYPICAL DAY: PATIENT DOES NOT DRINK
SKIP TO QUESTIONS 9-10: 1

## 2025-05-12 ASSESSMENT — PAIN SCALES - GENERAL: PAINLEVEL_OUTOF10: 0-NO PAIN

## 2025-05-12 NOTE — H&P (VIEW-ONLY)
"Subjective   Patient ID: Rinku De is a 29 y.o. female who presents for umbilical hernia repair consult.  HPI  Patient is new to clinic and presents for Umbilical Hernia Repair Consult.    Patient was originally supposed to have this repair performed by Dr. Mahoney as a combo case with Dr. De La Torre on 2025, however, due to Dr. Mahoney being unable to perform the surgery, Dr. Brizuela agreed to do it.  Dr. De La Torre will be performing a tubal ligation with umbilical hernia as port site for tubal ligation.  Patient has a history of Ulcerative Colitis.  CT scan of the [facility] reports a 3.2 cm defect at the umbilicus.    Patient indicates she has noted it has been present for a while.  It is tender when she is pregnant.  She denies any pain in the groins.  She does have family history of hernia with her father having hernias.  Patient Jaleel her weight has been stable.  Her newest addition is 9 weeks and she is breast-feeding.  Social History:  Tobacco Use - NO  Do you use any recreational drugs - NO  Alcohol Use - NO  Caffeine Intake - YES  Working - FULL TIME   Marital status - SINGLE   Living with - SIGNIFICANT OTHER AND 6 CHILDREN     Past Medical History:   Diagnosis Date    Anemia     Ulcerative colitis     Umbilical hernia      Past Surgical History:   Procedure Laterality Date     SECTION, LOW TRANSVERSE       Allergies   Allergen Reactions    Prednisone Psychosis    Prednisone Agitation and Other     Family History   Problem Relation Name Age of Onset    No Known Problems Mother      No Known Problems Father      No Known Problems Brother         Review of Systems    Objective   Physical Exam  /74 (BP Location: Right arm, Patient Position: Sitting, BP Cuff Size: Adult)   Pulse 79   Temp 36.6 °C (97.9 °F) (Temporal)   Resp 17   Ht 1.651 m (5' 5\")   Wt 77.2 kg (170 lb 3.2 oz)   SpO2 100%   BMI 28.32 kg/m²    GENERAL APPEARANCE: Patient appears in no acute distress.   EYES: " Sclera non-icteric, PERRLA.   ENT Normal appearance of ears and nose.   NECK/THYROID: Neck: no masses. Thyroid: no masses.   LYMPH NODES: No cervical or supraclavicular lymphadenopathy.   CARDIOVASCULAR Heart: RRR, no murmurs; Carotid bruits: none; Peripheral edema: none.   RESPIRATORY: Lungs: clear to auscultation bilaterally; no respiratory distress.   GI (ABDOMEN) No intraabdominal mass appreciated, no hepatosplenomegaly; Hernia: Patient with incarcerated umbilical hernia quite obvious on physical exam.  It is only partially reducible.  It palpates to have fatty tissue incarcerated within it.  She does have excess skin on the abdominal wall secondary to her multiple pregnancies.  No evidence of inguinal hernias.; Tenderness: none.   PSYCH: Patient oriented to time, place and person, normal affect.    Assessment/Plan   Problem List Items Addressed This Visit           ICD-10-CM    Umbilical hernia without obstruction and without gangrene - Primary K42.9    Recommend repair of incarcerated local hernia with mesh.  Will access abdomen through the umbilical hernia.  GYN will then do the laparoscopic tubal ligation.  I will then closed the umbilical defect with mesh after GYN surgery completed.  Risk-benefit alternatives of doing the surgery were thoroughly discussed with the patient agrees to proceed                 Mary Ellen Ornelas MA 05/12/25 1:43 PM

## 2025-05-12 NOTE — PROGRESS NOTES
"Subjective   Patient ID: Rinku De is a 29 y.o. female who presents for umbilical hernia repair consult.  HPI  Patient is new to clinic and presents for Umbilical Hernia Repair Consult.    Patient was originally supposed to have this repair performed by Dr. Mahoney as a combo case with Dr. De La Torre on 2025, however, due to Dr. Mahoney being unable to perform the surgery, Dr. Brizuela agreed to do it.  Dr. De La Torre will be performing a tubal ligation with umbilical hernia as port site for tubal ligation.  Patient has a history of Ulcerative Colitis.  CT scan of the [facility] reports a 3.2 cm defect at the umbilicus.    Patient indicates she has noted it has been present for a while.  It is tender when she is pregnant.  She denies any pain in the groins.  She does have family history of hernia with her father having hernias.  Patient Jaleel her weight has been stable.  Her newest addition is 9 weeks and she is breast-feeding.  Social History:  Tobacco Use - NO  Do you use any recreational drugs - NO  Alcohol Use - NO  Caffeine Intake - YES  Working - FULL TIME   Marital status - SINGLE   Living with - SIGNIFICANT OTHER AND 6 CHILDREN     Past Medical History:   Diagnosis Date    Anemia     Ulcerative colitis     Umbilical hernia      Past Surgical History:   Procedure Laterality Date     SECTION, LOW TRANSVERSE       Allergies   Allergen Reactions    Prednisone Psychosis    Prednisone Agitation and Other     Family History   Problem Relation Name Age of Onset    No Known Problems Mother      No Known Problems Father      No Known Problems Brother         Review of Systems    Objective   Physical Exam  /74 (BP Location: Right arm, Patient Position: Sitting, BP Cuff Size: Adult)   Pulse 79   Temp 36.6 °C (97.9 °F) (Temporal)   Resp 17   Ht 1.651 m (5' 5\")   Wt 77.2 kg (170 lb 3.2 oz)   SpO2 100%   BMI 28.32 kg/m²    GENERAL APPEARANCE: Patient appears in no acute distress.   EYES: " Sclera non-icteric, PERRLA.   ENT Normal appearance of ears and nose.   NECK/THYROID: Neck: no masses. Thyroid: no masses.   LYMPH NODES: No cervical or supraclavicular lymphadenopathy.   CARDIOVASCULAR Heart: RRR, no murmurs; Carotid bruits: none; Peripheral edema: none.   RESPIRATORY: Lungs: clear to auscultation bilaterally; no respiratory distress.   GI (ABDOMEN) No intraabdominal mass appreciated, no hepatosplenomegaly; Hernia: Patient with incarcerated umbilical hernia quite obvious on physical exam.  It is only partially reducible.  It palpates to have fatty tissue incarcerated within it.  She does have excess skin on the abdominal wall secondary to her multiple pregnancies.  No evidence of inguinal hernias.; Tenderness: none.   PSYCH: Patient oriented to time, place and person, normal affect.    Assessment/Plan   Problem List Items Addressed This Visit           ICD-10-CM    Umbilical hernia without obstruction and without gangrene - Primary K42.9    Recommend repair of incarcerated local hernia with mesh.  Will access abdomen through the umbilical hernia.  GYN will then do the laparoscopic tubal ligation.  I will then closed the umbilical defect with mesh after GYN surgery completed.  Risk-benefit alternatives of doing the surgery were thoroughly discussed with the patient agrees to proceed                 Mary Ellen Ornelas MA 05/12/25 1:43 PM

## 2025-05-12 NOTE — ASSESSMENT & PLAN NOTE
Recommend repair of incarcerated local hernia with mesh.  Will access abdomen through the umbilical hernia.  GYN will then do the laparoscopic tubal ligation.  I will then closed the umbilical defect with mesh after GYN surgery completed.  Risk-benefit alternatives of doing the surgery were thoroughly discussed with the patient agrees to proceed

## 2025-05-22 ENCOUNTER — PRE-ADMISSION TESTING (OUTPATIENT)
Dept: PREADMISSION TESTING | Facility: HOSPITAL | Age: 30
End: 2025-05-22
Payer: COMMERCIAL

## 2025-05-22 VITALS
RESPIRATION RATE: 16 BRPM | HEART RATE: 87 BPM | BODY MASS INDEX: 27.99 KG/M2 | TEMPERATURE: 96.8 F | WEIGHT: 168 LBS | OXYGEN SATURATION: 100 % | HEIGHT: 65 IN | DIASTOLIC BLOOD PRESSURE: 72 MMHG | SYSTOLIC BLOOD PRESSURE: 109 MMHG

## 2025-05-22 DIAGNOSIS — Z01.818 PREOP TESTING: Primary | ICD-10-CM

## 2025-05-22 PROCEDURE — 87081 CULTURE SCREEN ONLY: CPT | Mod: TRILAB

## 2025-05-22 PROCEDURE — 99204 OFFICE O/P NEW MOD 45 MIN: CPT

## 2025-05-22 RX ORDER — CHLORHEXIDINE GLUCONATE ORAL RINSE 1.2 MG/ML
SOLUTION DENTAL
Qty: 473 ML | Refills: 0 | Status: SHIPPED | OUTPATIENT
Start: 2025-05-22 | End: 2025-06-05

## 2025-05-22 ASSESSMENT — DUKE ACTIVITY SCORE INDEX (DASI)
CAN YOU DO HEAVY WORK AROUND THE HOUSE LIKE SCRUBBING FLOORS OR LIFTING AND MOVING HEAVY FURNITURE: YES
CAN YOU HAVE SEXUAL RELATIONS: YES
DASI METS SCORE: 9.9
CAN YOU DO LIGHT WORK AROUND THE HOUSE LIKE DUSTING OR WASHING DISHES: YES
CAN YOU PARTICIPATE IN STRENOUS SPORTS LIKE SWIMMING, SINGLES TENNIS, FOOTBALL, BASKETBALL, OR SKIING: YES
CAN YOU RUN A SHORT DISTANCE: YES
CAN YOU DO MODERATE WORK AROUND THE HOUSE LIKE VACUUMING, SWEEPING FLOORS OR CARRYING GROCERIES: YES
CAN YOU WALK A BLOCK OR TWO ON LEVEL GROUND: YES
CAN YOU WALK INDOORS, SUCH AS AROUND YOUR HOUSE: YES
CAN YOU CLIMB A FLIGHT OF STAIRS OR WALK UP A HILL: YES
CAN YOU PARTICIPATE IN MODERATE RECREATIONAL ACTIVITIES LIKE GOLF, BOWLING, DANCING, DOUBLES TENNIS OR THROWING A BASEBALL OR FOOTBALL: YES
CAN YOU TAKE CARE OF YOURSELF (EAT, DRESS, BATHE, OR USE TOILET): YES
CAN YOU DO YARD WORK LIKE RAKING LEAVES, WEEDING OR PUSHING A MOWER: YES
TOTAL_SCORE: 58.2

## 2025-05-22 ASSESSMENT — ENCOUNTER SYMPTOMS
RESPIRATORY NEGATIVE: 1
EYES NEGATIVE: 1
NEUROLOGICAL NEGATIVE: 1
MUSCULOSKELETAL NEGATIVE: 1
ALLERGIC/IMMUNOLOGIC NEGATIVE: 1
CARDIOVASCULAR NEGATIVE: 1
ENDOCRINE NEGATIVE: 1
CONSTITUTIONAL NEGATIVE: 1
GASTROINTESTINAL NEGATIVE: 1

## 2025-05-22 NOTE — CPM/PAT H&P
CPM/PAT Evaluation       Name: Rinku De (Rinku De)  /Age: 1995/ y.o.     In-Person       Chief Complaint: umbilical hernia and desire for tubal    HPI    Pt is a 27 y/o female with hx of anemia, ulcerative colitis and L6 scheduled for a combined case of tubal ligation and open umbilical hernia repair with Dr. Brizuela and Dr. De La Torre. Pt is chronically anemic. CT scan from [facility] reports a 3.2 cm defect at the umbilicus. Pt reports her hernia formed after her pregnancy with twins in  and states has gotten worse with following pregnancies. She describes intermittent pain that she explains as a discomfort. She reports no flare ups with her ulcerative colitis since being diagnosed in . She denies any changes in bowel habits, N/V and blood in stool. She denies CP, SOB and dizziness.     Past Medical History:  Diagnosis Date    Anemia     Ulcerative colitis     Umbilical hernia        Past Surgical History:  Procedure Laterality Date     SECTION, LOW TRANSVERSE         Patient  has no history on file for sexual activity.    Family History[1]    Allergies[2]    Prior to Admission medications    Medication Sig Start Date End Date Taking? Authorizing Provider   acetaminophen (Tylenol) 325 mg tablet Take 3 tablets (975 mg) by mouth every 6 hours. 23   Jannette Lo MD   chlorhexidine (Peridex) 0.12 % solution Use as directed. 25  Evelia Keys APRN-CNP   acetaminophen (Tylenol) 325 mg tablet Take 3 tablets (975 mg) by mouth every 6 hours. 3/6/25 5/22/25  Nanette Finnegan MD   azithromycin (Zithromax) 250 mg tablet TAKE 2 TABLETS BY MOUTH TODAY, THEN TAKE 1 TABLET DAILY FOR 4 DAYS AS DIRECTED 24  Historical Provider, MD   cephalexin (Keflex) 500 mg capsule Take 1 capsule (500 mg) by mouth every 12 hours. 25  Historical Provider, MD   docusate sodium (Colace) 100 mg capsule Take 1 capsule (100 mg) by mouth 2 times  "a day as needed for constipation. 12/17/23 5/22/25  Jannette Lo MD   docusate sodium (Colace) 100 mg capsule Take 1 capsule (100 mg) by mouth 2 times a day as needed for constipation. 3/6/25 5/22/25  Nanette Finnegan MD   ferrous sulfate tablet Take 1 tablet (325 mg) by mouth once daily with breakfast. 3/6/25 5/22/25  Nanette Finnegan MD   ibuprofen 600 mg tablet Take 1 tablet (600 mg) by mouth every 6 hours if needed for mild pain (1 - 3). 12/17/23 5/22/25  Jannette Lo MD   ibuprofen 600 mg tablet Take 1 tablet (600 mg) by mouth 4 times a day as needed for mild pain (1 - 3) (pain). 3/6/25 5/22/25  Nanette Finnegan MD   -iron fum-folic acid 29 mg iron- 1 mg tablet Take 1 tablet by mouth once daily.  5/22/25  Historical Provider, MD   PNV 3-IRON FUM,GLUC-FOLIC ACID ORAL PNV  5/22/25  Historical Provider, MD   prenatal no115/iron/folic acid (PRENATAL 19 ORAL) Take by mouth once daily.  5/22/25  Historical Provider, MD        Review of Systems   Constitutional: Negative.    HENT: Negative.     Eyes: Negative.    Respiratory: Negative.     Cardiovascular: Negative.    Gastrointestinal: Negative.         She reports discomfort with her umbilical hernia. She reports she experiences \"flare-ups\" intermittently about once a day that will pass after a couple minutes.     Endocrine: Negative.    Genitourinary: Negative.    Musculoskeletal: Negative.    Skin: Negative.    Allergic/Immunologic: Negative.    Neurological: Negative.          Physical Exam  Vitals reviewed.   Constitutional:       Appearance: Normal appearance.   HENT:      Head: Normocephalic and atraumatic.   Eyes:      Extraocular Movements: Extraocular movements intact.      Pupils: Pupils are equal, round, and reactive to light.   Cardiovascular:      Rate and Rhythm: Normal rate and regular rhythm.   Pulmonary:      Effort: Pulmonary effort is normal.      Breath sounds: Normal breath sounds.   Abdominal:      Palpations: Abdomen is soft. " "     Comments: An umbilical hernia is palpated and reducible. No pain upon palpation.   Musculoskeletal:         General: Normal range of motion.      Cervical back: Normal range of motion and neck supple.   Skin:     General: Skin is warm and dry.      Capillary Refill: Capillary refill takes less than 2 seconds.   Neurological:      General: No focal deficit present.      Mental Status: She is alert and oriented to person, place, and time.   Psychiatric:         Mood and Affect: Mood normal.         Behavior: Behavior normal.          PAT AIRWAY:   Airway:     Mallampati::  I    TM distance::  >3 FB    Neck ROM::  Full  normal        Testing/Diagnostic:   Pt is chronically anemic. Hg 8.5 from 3/4/2025.     CBC  Order: [id number]            Component  Ref Range & Units 2 mo ago  (3/4/25) 1 yr ago  (12/16/23) 1 yr ago  (12/15/23) 1 yr ago  (6/9/23)   WBC  4.4 - 11.3 x10*3/uL 8.5 8.0 7.3 5.7 R   nRBC  0.0 - 0.0 /100 WBCs 0.0 0.0 0.0 0 R, CM   RBC  4.00 - 5.20 x10*6/uL 4.26 3.73 Low  4.77 4.89 R   Hemoglobin  12.0 - 16.0 g/dL 8.5 Low  9.0 Low  11.6 Low  11.0 Low  R   Hematocrit  36.0 - 46.0 % 29.6 Low  28.6 Low  36.6 36.6 R   MCV  80 - 100 fL 70 Low  77 Low  77 Low  74.8 Low  R   MCH  26.0 - 34.0 pg 20.0 Low  24.1 Low  24.3 Low  22.5 Low  R   MCHC  32.0 - 36.0 g/dL 28.7 Low  31.5 Low  31.7 Low  30.1 Low  R   RDW  11.5 - 14.5 % 20.0 High  14.8 High  14.8 High     Platelets  150 - 450 x10*3/uL 363 255 309 411 R   Resulting [facility] [facility]            Visit Vitals  /72   Pulse 87   Temp 36 °C (96.8 °F) (Temporal)   Resp 16   Ht 1.651 m (5' 5\")   Wt 76.2 kg (168 lb)   SpO2 100%   Breastfeeding Yes   BMI 27.96 kg/m²   OB Status Recent pregnancy   Smoking Status Never   BSA 1.87 m²       CHADS2: 0  Revised Cardiac Risk: 0  ARISCAT: 11  DIPESH: -9.08      Assessment and Plan:     Umbilical hernia w/o obstruction and without gangrene  Open umbilical hernia repair   Encounter for sterilization   Tubal " ligation    Ro Jones PA-C                [1]   Family History  Problem Relation Name Age of Onset    No Known Problems Mother      No Known Problems Father      No Known Problems Brother     [2]   Allergies  Allergen Reactions    Prednisone Psychosis    Prednisone Agitation and Other

## 2025-05-22 NOTE — PREPROCEDURE INSTRUCTIONS
Medication List            Accurate as of May 22, 2025  7:46 AM. Always use your most recent med list.                acetaminophen 325 mg tablet  Commonly known as: Tylenol  Take 3 tablets (975 mg) by mouth every 6 hours.  Medication Adjustments for Surgery: Take/Use as prescribed                                  Preoperative Fasting Guidelines    Why must I stop eating and drinking near surgery time?  With sedation, food or liquid in your stomach can enter your lungs causing serious complications  Increases nausea and vomiting    When do I need to stop eating and drinking before my surgery?  Do not eat any food after midnight the night before your surgery/procedure.  You may have up to 13.5 ounces of clear liquid until TWO hours before your instructed arrival time to the hospital.  This includes water, black tea/coffee, (no milk or cream) apple juice, and electrolyte drinks (Gatorade)  You may chew gum until TWO hours before your surgery/procedure            Patient Information: Pre-Operative Infection Prevention Measures     Why did I have my nose, under my arms, and groin swabbed?  The purpose of the swab is to identify Staphylococcus aureus inside your nose or on your skin.  The swab was sent to the laboratory for culture.  A positive swab/culture for Staphylococcus aureus is called colonization or carriage.      What is Staphylococcus aureus?  Staphylococcus aureus, also known as “staph”, is a germ found on the skin or in the nose of healthy people.  Sometimes Staphylococcus aureus can get into the body and cause an infection.  This can be minor (such as pimples, boils, or other skin problems).  It might also be serious (such as a blood infection, pneumonia, or a surgical site infection).    What is Staphylococcus aureus colonization or carriage?  Colonization or carriage means that a person has the germ but is not sick from it.  These bacteria can be spread on the hands or when breathing or  sneezing.    How is Staphylococcus aureus spread?  It is most often spread by close contact with a person or item that carries it.    What happens if my culture is positive for Staphylococcus aureus?  Your doctor/medical team will use this information to guide any antibiotic treatment which may be necessary.  Regardless of the culture results, we will clean the inside of your nose with a betadine swab just before you have your surgery.      Will I get an infection if I have Staphylococcus aureus in my nose or on my skin?  Anyone can get an infection with Staphylococcus aureus.  However, the best way to reduce your risk of infection is to follow the instructions provided to you for the use of your CHG soap and dental rinse.        Patient Information: Oral/Dental Rinse    What is oral/dental rinse?   It is a mouthwash. It is a way of cleaning the mouth with a germ-killing solution before your surgery.  The solution contains chlorhexidine, commonly known as CHG.   It is used inside the mouth to kill a bacteria known as Staphylococcus aureus.  Let your doctor know if you are allergic to Chlorhexidine.    Why do I need to use CHG oral/dental rinse?  The CHG oral/dental rinse helps to kill a bacteria in your mouth known as Staphylococcus aureus.     This reduces the risk of infection at the surgical site.      Using your CHG oral/dental rinse  STEPS:  Use your CHG oral/dental rinse after you brush your teeth the night before (at bedtime) and the morning of your surgery.  Follow all directions on your prescription label.    Use the cap on the container to measure 15ml   Swish (gargle if you can) the mouthwash in your mouth for at least 30 seconds, (do not swallow) and spit out  After you use your CHG rinse, do not rinse your mouth with water, drink or eat.  Please refer to the prescription label for the appropriate time to resume oral intake      What side effects might I have using the CHG oral/dental rinse?  CHG rinse  will stick to plaque on the teeth.  Brush and floss just before use.  Teeth brushing will help avoid staining of plaque during use.      Patient Information: Home Preoperative Antibacterial Shower      What is a home preoperative antibacterial shower?  This shower is a way of cleaning the skin with a germ-killing solution before surgery.  The solution contains chlorhexidine, commonly known as CHG.  CHG is a skin cleanser with germ-killing ability.  Let your doctor know if you are allergic to chlorhexidine.    Why do I need to take a preoperative antibacterial shower?  Skin is not sterile.  It is best to try to make your skin as free of germs as possible before surgery.  Proper cleansing with a germ-killing soap before surgery can lower the number of germs on your skin.  This helps to reduce the risk of infection at the surgical site.  Following the instructions listed below will help you prepare your skin for surgery.      How do I use the solution?  Steps:  Begin using your CHG soap 5 days before your scheduled surgery on ________________________.    First, wash and rinse your hair using the CHG soap. Keep CHG soap away from ear canals and eyes.  Rinse completely, do not condition.  Hair extensions should be removed.  Wash your face with your normal soap and rinse.    Apply the CHG solution to a clean wet washcloth.  Turn the water off or move away from the water spray to avoid premature rinsing of the CHG soap as you are applying.   Firmly lather your entire body from the neck down.  Do not use on your face.  Pay special attention to the area(s) where your incision(s) will be located unless they are on your face.  Avoid scrubbing your skin too hard.  The important point is to have the CHG soap sit on your skin for 3 minutes.    When the 3 minutes are up, turn on the water and rinse the CHG solution off your body completely.   DO NOT wash with regular soap after you have used the CHG soap solution  Pat yourself dry  with a clean, freshly-laundered towel.  DO NOT apply powders, deodorants, or lotions.  Dress in clean, freshly laundered nightclothes.    Be sure to sleep with clean, freshly laundered sheets.  Be aware that CHG will cause stains on fabrics; if you wash them with bleach after use.  Rinse your washcloth and other linens that have contact with CHG completely.  Use only non-chlorine detergents to launder the items used.   The morning of surgery is the fifth day.  Repeat the above steps and dress in clean comfortable clothing     Whom should I contact if I have any questions regarding the use of CHG soap?  Call the [facility] at [phone] if you have any questions.                   PAT DISCHARGE INSTRUCTIONS    Please call the Same Day Surgery (SDS) Department of the hospital where your procedure will be performed after 2:00 PM the day before your surgery. If you are scheduled on a Monday, or a Tuesday following a Monday holiday, you will need to call on the last business day prior to your surgery.    [facility]  [address] [place] [address] [phone]  [facility] Floor      [facility]  [id number] [place] [address], [address] [phone]  [facility]  [address] [place] [address].  [place] [id number] [phone]    Please let your surgeon know if:      You develop any open sores, shingles, burning or painful urination as these may increase your risk of an infection.   You no longer wish to have the surgery.   Any other personal circumstances change that may lead to the need to cancel or defer this surgery-such as being sick or getting admitted to any hospital within one week of your planned procedure.    Your contact details change, such as a change of address or phone number.    Starting now:     Please DO NOT drink alcohol  or smoke for 24 hours before surgery. It is well known that quitting smoking can make a huge difference to your health and recovery from surgery. The longer you abstain from smoking, the better your chances of a healthy recovery. If you need help with quitting, call 9-800QUIT-NOW to be connected to a trained counselor who will discuss the best methods to help you quit.     Before your surgery:    Please stop all supplements 7 days prior to surgery. Or as directed by your surgeon.   Please stop taking NSAID pain medicine such as Advil and Motrin 7 days before surgery.    If you develop any fever, cough, cold, rashes, cuts, scratches, scrapes, urinary symptoms or infection anywhere on your body (including teeth and gums) prior to surgery, please call your surgeon’s office as soon as possible. This may require treatment to reduce the chance of cancellation on the day of surgery.    The day before your surgery:   DIET- Please follow the diet instructions at the top of your packet.   Get a good night’s rest.  Use the special soap for bathing if you have been instructed to use one.    Scheduled surgery times may change and you will be notified if this occurs - please check your personal voicemail for any updates.     On the morning of surgery:   Wear comfortable, loose fitting clothes which open in the front. Please do not wear moisturizers, creams, lotions, makeup or perfume.    Please bring with you to surgery:   Photo ID and insurance card   Current list of medicines and allergies   Pacemaker/ Defibrillator/Heart stent cards   CPAP machine and mask    Slings/ splints/ crutches   A copy of your complete advanced directive/DHPOA.    Please do NOT bring with you to surgery:   All jewelry and valuables should be left at home.   Prosthetic devices such as contact lenses, hearing aids, dentures, eyelash extensions, hairpins and body piercings must be removed prior to going in to the surgical suite.    After outpatient  surgery:   A responsible adult MUST accompany you at the time of discharge and stay with you for 24 hours after your surgery. You may NOT drive yourself home after surgery.    Do not drive, operate machinery, make critical decisions or do activities that require co-ordination or balance until after a night’s sleep.   Do not drink alcoholic beverages for 24 hours.   Instructions for resuming your medications will be provided by your surgeon.    CALL YOUR DOCTOR AFTER SURGERY IF YOU HAVE:     Chills and/or a fever of 101° F or higher.    Redness, swelling, pus or drainage from your surgical wound or a bad smell from the wound.    Lightheadedness, fainting or confusion.    Persistent vomiting (throwing up) and are not able to eat or drink for 12 hours.    Three or more loose, watery bowel movements in 24 hours (diarrhea).   Difficulty or pain while urinating( after non-urological surgery)    Pain and swelling in your legs, especially if it is only on one side.    Difficulty breathing or are breathing faster than normal.    Any new concerning symptoms.

## 2025-05-24 LAB — STAPHYLOCOCCUS SPEC CULT: ABNORMAL

## 2025-05-27 DIAGNOSIS — A49.01 MSSA (METHICILLIN SUSCEPTIBLE STAPHYLOCOCCUS AUREUS): Primary | ICD-10-CM

## 2025-05-27 RX ORDER — MUPIROCIN 20 MG/G
OINTMENT TOPICAL 2 TIMES DAILY
Qty: 22 G | Refills: 0 | Status: SHIPPED | OUTPATIENT
Start: 2025-05-27 | End: 2025-06-06

## 2025-06-05 ENCOUNTER — PHARMACY VISIT (OUTPATIENT)
Dept: PHARMACY | Facility: CLINIC | Age: 30
End: 2025-06-05
Payer: MEDICAID

## 2025-06-05 ENCOUNTER — ANESTHESIA EVENT (OUTPATIENT)
Dept: OPERATING ROOM | Facility: HOSPITAL | Age: 30
End: 2025-06-05
Payer: COMMERCIAL

## 2025-06-05 ENCOUNTER — ANESTHESIA (OUTPATIENT)
Dept: OPERATING ROOM | Facility: HOSPITAL | Age: 30
End: 2025-06-05
Payer: COMMERCIAL

## 2025-06-05 ENCOUNTER — HOSPITAL ENCOUNTER (OUTPATIENT)
Facility: HOSPITAL | Age: 30
Setting detail: OUTPATIENT SURGERY
Discharge: HOME | End: 2025-06-05
Attending: SURGERY | Admitting: SURGERY
Payer: COMMERCIAL

## 2025-06-05 VITALS
WEIGHT: 167.99 LBS | OXYGEN SATURATION: 98 % | HEART RATE: 47 BPM | BODY MASS INDEX: 27.96 KG/M2 | DIASTOLIC BLOOD PRESSURE: 59 MMHG | SYSTOLIC BLOOD PRESSURE: 95 MMHG | RESPIRATION RATE: 18 BRPM | TEMPERATURE: 96.8 F

## 2025-06-05 DIAGNOSIS — K42.9 UMBILICAL HERNIA WITHOUT OBSTRUCTION AND WITHOUT GANGRENE: Primary | ICD-10-CM

## 2025-06-05 LAB — PREGNANCY TEST URINE, POC: NEGATIVE

## 2025-06-05 PROCEDURE — 88302 TISSUE EXAM BY PATHOLOGIST: CPT | Performed by: PATHOLOGY

## 2025-06-05 PROCEDURE — 3700000001 HC GENERAL ANESTHESIA TIME - INITIAL BASE CHARGE: Performed by: SURGERY

## 2025-06-05 PROCEDURE — 2720000007 HC OR 272 NO HCPCS: Performed by: SURGERY

## 2025-06-05 PROCEDURE — 49594 RPR AA HRN 1ST 3-10 NCR/STRN: CPT | Performed by: SURGERY

## 2025-06-05 PROCEDURE — 2500000004 HC RX 250 GENERAL PHARMACY W/ HCPCS (ALT 636 FOR OP/ED): Performed by: SURGERY

## 2025-06-05 PROCEDURE — 81025 URINE PREGNANCY TEST: CPT | Performed by: SURGERY

## 2025-06-05 PROCEDURE — 7100000001 HC RECOVERY ROOM TIME - INITIAL BASE CHARGE: Performed by: SURGERY

## 2025-06-05 PROCEDURE — 3700000002 HC GENERAL ANESTHESIA TIME - EACH INCREMENTAL 1 MINUTE: Performed by: SURGERY

## 2025-06-05 PROCEDURE — A49594 PR RPR AA HERNIA 1ST 3-10 CM NCRC8/STRANGULATED: Performed by: ANESTHESIOLOGY

## 2025-06-05 PROCEDURE — 3600000003 HC OR TIME - INITIAL BASE CHARGE - PROCEDURE LEVEL THREE: Performed by: SURGERY

## 2025-06-05 PROCEDURE — 2500000004 HC RX 250 GENERAL PHARMACY W/ HCPCS (ALT 636 FOR OP/ED): Mod: JZ | Performed by: ANESTHESIOLOGY

## 2025-06-05 PROCEDURE — 88302 TISSUE EXAM BY PATHOLOGIST: CPT | Mod: TC,TRILAB,WESLAB | Performed by: SURGERY

## 2025-06-05 PROCEDURE — 2780000003 HC OR 278 NO HCPCS: Performed by: SURGERY

## 2025-06-05 PROCEDURE — RXMED WILLOW AMBULATORY MEDICATION CHARGE

## 2025-06-05 PROCEDURE — 7100000009 HC PHASE TWO TIME - INITIAL BASE CHARGE: Performed by: SURGERY

## 2025-06-05 PROCEDURE — C1781 MESH (IMPLANTABLE): HCPCS | Performed by: SURGERY

## 2025-06-05 PROCEDURE — 49594 RPR AA HRN 1ST 3-10 NCR/STRN: CPT

## 2025-06-05 PROCEDURE — 3600000008 HC OR TIME - EACH INCREMENTAL 1 MINUTE - PROCEDURE LEVEL THREE: Performed by: SURGERY

## 2025-06-05 PROCEDURE — 7100000002 HC RECOVERY ROOM TIME - EACH INCREMENTAL 1 MINUTE: Performed by: SURGERY

## 2025-06-05 PROCEDURE — 7100000010 HC PHASE TWO TIME - EACH INCREMENTAL 1 MINUTE: Performed by: SURGERY

## 2025-06-05 PROCEDURE — A49594 PR RPR AA HERNIA 1ST 3-10 CM NCRC8/STRANGULATED: Performed by: NURSE ANESTHETIST, CERTIFIED REGISTERED

## 2025-06-05 PROCEDURE — 2500000004 HC RX 250 GENERAL PHARMACY W/ HCPCS (ALT 636 FOR OP/ED): Mod: JZ | Performed by: NURSE ANESTHETIST, CERTIFIED REGISTERED

## 2025-06-05 DEVICE — VENTRALEX ST HERNIA PATCH, 6.4 CM (2.5"), CIRCLE
Type: IMPLANTABLE DEVICE | Site: UMBILICAL | Status: FUNCTIONAL
Brand: VENTRALEX

## 2025-06-05 RX ORDER — ROCURONIUM BROMIDE 10 MG/ML
INJECTION, SOLUTION INTRAVENOUS AS NEEDED
Status: DISCONTINUED | OUTPATIENT
Start: 2025-06-05 | End: 2025-06-05

## 2025-06-05 RX ORDER — GLYCOPYRROLATE 0.2 MG/ML
INJECTION INTRAMUSCULAR; INTRAVENOUS AS NEEDED
Status: DISCONTINUED | OUTPATIENT
Start: 2025-06-05 | End: 2025-06-05

## 2025-06-05 RX ORDER — PHENYLEPHRINE HCL IN 0.9% NACL 1 MG/10 ML
SYRINGE (ML) INTRAVENOUS AS NEEDED
Status: DISCONTINUED | OUTPATIENT
Start: 2025-06-05 | End: 2025-06-05

## 2025-06-05 RX ORDER — ONDANSETRON HYDROCHLORIDE 2 MG/ML
4 INJECTION, SOLUTION INTRAVENOUS ONCE AS NEEDED
Status: DISCONTINUED | OUTPATIENT
Start: 2025-06-05 | End: 2025-06-05 | Stop reason: HOSPADM

## 2025-06-05 RX ORDER — MIDAZOLAM HYDROCHLORIDE 1 MG/ML
1 INJECTION, SOLUTION INTRAMUSCULAR; INTRAVENOUS ONCE AS NEEDED
Status: DISCONTINUED | OUTPATIENT
Start: 2025-06-05 | End: 2025-06-05 | Stop reason: HOSPADM

## 2025-06-05 RX ORDER — ONDANSETRON HYDROCHLORIDE 2 MG/ML
INJECTION, SOLUTION INTRAVENOUS AS NEEDED
Status: DISCONTINUED | OUTPATIENT
Start: 2025-06-05 | End: 2025-06-05

## 2025-06-05 RX ORDER — LIDOCAINE HYDROCHLORIDE AND EPINEPHRINE 10; 10 UG/ML; MG/ML
INJECTION, SOLUTION INFILTRATION; PERINEURAL AS NEEDED
Status: DISCONTINUED | OUTPATIENT
Start: 2025-06-05 | End: 2025-06-05 | Stop reason: HOSPADM

## 2025-06-05 RX ORDER — SODIUM CHLORIDE, SODIUM LACTATE, POTASSIUM CHLORIDE, CALCIUM CHLORIDE 600; 310; 30; 20 MG/100ML; MG/100ML; MG/100ML; MG/100ML
INJECTION, SOLUTION INTRAVENOUS CONTINUOUS PRN
Status: DISCONTINUED | OUTPATIENT
Start: 2025-06-05 | End: 2025-06-05

## 2025-06-05 RX ORDER — DEXMEDETOMIDINE IN 0.9 % NACL 20 MCG/5ML
SYRINGE (ML) INTRAVENOUS AS NEEDED
Status: DISCONTINUED | OUTPATIENT
Start: 2025-06-05 | End: 2025-06-05

## 2025-06-05 RX ORDER — OXYCODONE AND ACETAMINOPHEN 5; 325 MG/1; MG/1
1 TABLET ORAL EVERY 4 HOURS PRN
Qty: 30 TABLET | Refills: 0 | Status: SHIPPED | OUTPATIENT
Start: 2025-06-05

## 2025-06-05 RX ORDER — CEFAZOLIN SODIUM 2 G/100ML
2 INJECTION, SOLUTION INTRAVENOUS ONCE
Status: COMPLETED | OUTPATIENT
Start: 2025-06-05 | End: 2025-06-05

## 2025-06-05 RX ORDER — LIDOCAINE HYDROCHLORIDE 10 MG/ML
0.1 INJECTION, SOLUTION INFILTRATION; PERINEURAL ONCE
Status: DISCONTINUED | OUTPATIENT
Start: 2025-06-05 | End: 2025-06-05 | Stop reason: HOSPADM

## 2025-06-05 RX ORDER — FENTANYL CITRATE 50 UG/ML
INJECTION, SOLUTION INTRAMUSCULAR; INTRAVENOUS AS NEEDED
Status: DISCONTINUED | OUTPATIENT
Start: 2025-06-05 | End: 2025-06-05

## 2025-06-05 RX ORDER — FENTANYL CITRATE 50 UG/ML
50 INJECTION, SOLUTION INTRAMUSCULAR; INTRAVENOUS EVERY 5 MIN PRN
Status: DISCONTINUED | OUTPATIENT
Start: 2025-06-05 | End: 2025-06-05 | Stop reason: HOSPADM

## 2025-06-05 RX ORDER — ALBUTEROL SULFATE 0.83 MG/ML
2.5 SOLUTION RESPIRATORY (INHALATION) ONCE
Status: DISCONTINUED | OUTPATIENT
Start: 2025-06-05 | End: 2025-06-05 | Stop reason: HOSPADM

## 2025-06-05 RX ORDER — SODIUM CHLORIDE, SODIUM LACTATE, POTASSIUM CHLORIDE, CALCIUM CHLORIDE 600; 310; 30; 20 MG/100ML; MG/100ML; MG/100ML; MG/100ML
100 INJECTION, SOLUTION INTRAVENOUS CONTINUOUS
Status: DISCONTINUED | OUTPATIENT
Start: 2025-06-05 | End: 2025-06-05 | Stop reason: HOSPADM

## 2025-06-05 RX ORDER — BUPIVACAINE HYDROCHLORIDE 5 MG/ML
INJECTION, SOLUTION PERINEURAL AS NEEDED
Status: DISCONTINUED | OUTPATIENT
Start: 2025-06-05 | End: 2025-06-05 | Stop reason: HOSPADM

## 2025-06-05 RX ORDER — HYDROMORPHONE HYDROCHLORIDE 0.2 MG/ML
0.2 INJECTION INTRAMUSCULAR; INTRAVENOUS; SUBCUTANEOUS EVERY 5 MIN PRN
Status: DISCONTINUED | OUTPATIENT
Start: 2025-06-05 | End: 2025-06-05 | Stop reason: HOSPADM

## 2025-06-05 RX ORDER — MEPERIDINE HYDROCHLORIDE 25 MG/ML
12.5 INJECTION INTRAMUSCULAR; INTRAVENOUS; SUBCUTANEOUS EVERY 10 MIN PRN
Status: DISCONTINUED | OUTPATIENT
Start: 2025-06-05 | End: 2025-06-05 | Stop reason: HOSPADM

## 2025-06-05 RX ORDER — MIDAZOLAM HYDROCHLORIDE 1 MG/ML
INJECTION, SOLUTION INTRAMUSCULAR; INTRAVENOUS AS NEEDED
Status: DISCONTINUED | OUTPATIENT
Start: 2025-06-05 | End: 2025-06-05

## 2025-06-05 RX ORDER — PROPOFOL 10 MG/ML
INJECTION, EMULSION INTRAVENOUS AS NEEDED
Status: DISCONTINUED | OUTPATIENT
Start: 2025-06-05 | End: 2025-06-05

## 2025-06-05 RX ORDER — KETOROLAC TROMETHAMINE 30 MG/ML
INJECTION, SOLUTION INTRAMUSCULAR; INTRAVENOUS AS NEEDED
Status: DISCONTINUED | OUTPATIENT
Start: 2025-06-05 | End: 2025-06-05

## 2025-06-05 RX ADMIN — SUGAMMADEX 200 MG: 100 INJECTION, SOLUTION INTRAVENOUS at 09:35

## 2025-06-05 RX ADMIN — Medication 4 MCG: at 08:34

## 2025-06-05 RX ADMIN — Medication 50 MCG: at 09:28

## 2025-06-05 RX ADMIN — HYDROMORPHONE HYDROCHLORIDE 0.2 MG: 0.2 INJECTION, SOLUTION INTRAMUSCULAR; INTRAVENOUS; SUBCUTANEOUS at 10:06

## 2025-06-05 RX ADMIN — PROPOFOL 200 MG: 10 INJECTION, EMULSION INTRAVENOUS at 08:19

## 2025-06-05 RX ADMIN — CEFAZOLIN SODIUM 2 G: 2 INJECTION, SOLUTION INTRAVENOUS at 08:15

## 2025-06-05 RX ADMIN — SODIUM CHLORIDE, POTASSIUM CHLORIDE, SODIUM LACTATE AND CALCIUM CHLORIDE: 600; 310; 30; 20 INJECTION, SOLUTION INTRAVENOUS at 08:16

## 2025-06-05 RX ADMIN — KETOROLAC TROMETHAMINE 30 MG: 30 INJECTION, SOLUTION INTRAMUSCULAR at 09:23

## 2025-06-05 RX ADMIN — ONDANSETRON 4 MG: 2 INJECTION, SOLUTION INTRAMUSCULAR; INTRAVENOUS at 09:23

## 2025-06-05 RX ADMIN — CEFAZOLIN SODIUM 2 G: 2 INJECTION, SOLUTION INTRAVENOUS at 08:22

## 2025-06-05 RX ADMIN — FENTANYL CITRATE 50 MCG: 50 INJECTION INTRAMUSCULAR; INTRAVENOUS at 10:01

## 2025-06-05 RX ADMIN — FENTANYL CITRATE 50 MCG: 0.05 INJECTION, SOLUTION INTRAMUSCULAR; INTRAVENOUS at 08:37

## 2025-06-05 RX ADMIN — GLYCOPYRROLATE 0.2 MG: 0.2 INJECTION INTRAMUSCULAR; INTRAVENOUS at 09:31

## 2025-06-05 RX ADMIN — FENTANYL CITRATE 50 MCG: 0.05 INJECTION, SOLUTION INTRAMUSCULAR; INTRAVENOUS at 08:19

## 2025-06-05 RX ADMIN — Medication 6 MCG: at 08:24

## 2025-06-05 RX ADMIN — ROCURONIUM BROMIDE 50 MG: 10 INJECTION, SOLUTION INTRAVENOUS at 08:20

## 2025-06-05 RX ADMIN — Medication 100 MCG: at 09:19

## 2025-06-05 RX ADMIN — Medication 6 MCG: at 08:59

## 2025-06-05 RX ADMIN — ROCURONIUM BROMIDE 20 MG: 10 INJECTION, SOLUTION INTRAVENOUS at 08:44

## 2025-06-05 RX ADMIN — DEXAMETHASONE SODIUM PHOSPHATE 8 MG: 4 INJECTION, SOLUTION INTRAMUSCULAR; INTRAVENOUS at 08:22

## 2025-06-05 RX ADMIN — MIDAZOLAM 2 MG: 1 INJECTION INTRAMUSCULAR; INTRAVENOUS at 08:12

## 2025-06-05 RX ADMIN — FENTANYL CITRATE 50 MCG: 0.05 INJECTION, SOLUTION INTRAMUSCULAR; INTRAVENOUS at 09:01

## 2025-06-05 RX ADMIN — ROCURONIUM BROMIDE 10 MG: 10 INJECTION, SOLUTION INTRAVENOUS at 09:08

## 2025-06-05 ASSESSMENT — PAIN SCALES - GENERAL
PAINLEVEL_OUTOF10: 5 - MODERATE PAIN
PAINLEVEL_OUTOF10: 0 - NO PAIN
PAINLEVEL_OUTOF10: 7
PAINLEVEL_OUTOF10: 1
PAINLEVEL_OUTOF10: 0 - NO PAIN
PAINLEVEL_OUTOF10: 6
PAINLEVEL_OUTOF10: 4
PAINLEVEL_OUTOF10: 0 - NO PAIN

## 2025-06-05 ASSESSMENT — PAIN DESCRIPTION - DESCRIPTORS
DESCRIPTORS: DULL
DESCRIPTORS: ACHING
DESCRIPTORS: DULL

## 2025-06-05 ASSESSMENT — COLUMBIA-SUICIDE SEVERITY RATING SCALE - C-SSRS
2. HAVE YOU ACTUALLY HAD ANY THOUGHTS OF KILLING YOURSELF?: NO
6. HAVE YOU EVER DONE ANYTHING, STARTED TO DO ANYTHING, OR PREPARED TO DO ANYTHING TO END YOUR LIFE?: NO

## 2025-06-05 ASSESSMENT — PAIN - FUNCTIONAL ASSESSMENT
PAIN_FUNCTIONAL_ASSESSMENT: 0-10

## 2025-06-05 NOTE — ANESTHESIA PROCEDURE NOTES
Airway  Date/Time: 6/5/2025 8:22 AM  Reason: elective    Airway not difficult    Staffing  Performed: KAYLA   Authorized by: Pablo White DO    Performed by: KYRA Butts-AGGIE  Patient location during procedure: OR    Patient Condition  Indications for airway management: anesthesia  Patient position: sniffing  Planned trial extubation  Sedation level: deep     Final Airway Details   Preoxygenated: yes  Final airway type: endotracheal airway  Successful airway: ETT  Cuffed: yes   Successful intubation technique: direct laryngoscopy  Adjuncts used in placement: intubating stylet  Endotracheal tube insertion site: oral  Blade: Leon  Blade size: #3  ETT size (mm): 7.0  Cormack-Lehane Classification: grade I - full view of glottis  Placement verified by: chest auscultation   Cuff volume (mL): 8  Measured from: lips  ETT to lips (cm): 21  Number of attempts at approach: 1

## 2025-06-05 NOTE — ANESTHESIA PREPROCEDURE EVALUATION
Patient: Rinku De    Procedure Information       Date/Time: 06/05/25 0730    Procedures:       Open Umbilical Hernia Repair with Mesh      Tubal Ligation (Bilateral)    Location: [facility] / [facility]    Surgeons: Krystal Brizuela MD; Jesusita De La Torre MD            Relevant Problems   No relevant active problems       Clinical information reviewed:   Tobacco  Allergies  Meds  Problems  Med Hx  Surg Hx  OB Status    Fam Hx  Soc Hx        NPO Detail:  NPO/Void Status  Date of Last Liquid: 06/04/25  Time of Last Liquid: 2100  Date of Last Solid: 06/04/25  Time of Last Solid: 2100  Time of Last Void: 0655         Physical Exam    Airway  Mallampati: II  TM distance: >3 FB     Cardiovascular    Dental    Pulmonary    Abdominal            Anesthesia Plan    History of general anesthesia?: yes  History of complications of general anesthesia?: no    ASA 2     general     intravenous induction   Anesthetic plan and risks discussed with patient.

## 2025-06-05 NOTE — H&P
History Of Present Illness  Rinku De is a 30 y.o. female presenting with IP GYN HPI/REASON FOR CONSULT: laparoscopic tubal ligation, hernia repair .     Past Medical History  She has a past medical history of Anemia, Ulcerative colitis, and Umbilical hernia.    Surgical History  She has a past surgical history that includes  section, low transverse ().     OB History  OB History    Para Term  AB Living   10 9 5 4 1 2   SAB IAB Ectopic Multiple Live Births   1 0 0 1 2      # Outcome Date GA Lbr Devante/2nd Weight Sex Type Anes PTL Lv   10 Term 25 39w0d 01:39 / 00:06 2.86 kg M Vag-Spont EPI  SHIV   9  12/15/23 36w1d  1.86 kg F CS-LTranv Spinal  SHIV   8 Term 21    M  EPI     7A  10/05/19    M Vag-Spont EPI Y    7B      M  EPI     6 Term 18 38w0d   M Vag-Spont EPI N    5 SAB            4             3             2 Term            1 Term                Sexual History  Social History     Substance and Sexual Activity   Sexual Activity Not on file        Social History  She reports that she has never smoked. She has never been exposed to tobacco smoke. She has never used smokeless tobacco. She reports that she does not currently use alcohol. She reports that she does not use drugs.    Family History  Family History[1]     Allergies  Prednisone and Prednisone    Review of Systems   All other systems reviewed and are negative.       Physical Exam  Constitutional:       Appearance: Normal appearance.   HENT:      Head: Normocephalic.   Eyes:      Extraocular Movements: Extraocular movements intact.   Cardiovascular:      Rate and Rhythm: Normal rate.      Pulses: Normal pulses.   Pulmonary:      Effort: Pulmonary effort is normal.   Genitourinary:     General: Normal vulva.   Musculoskeletal:         General: Normal range of motion.      Cervical back: Normal range of motion and neck supple.   Skin:     General: Skin is warm and dry.  "  Neurological:      General: No focal deficit present.      Mental Status: She is alert and oriented to person, place, and time.   Psychiatric:         Mood and Affect: Mood normal.         Behavior: Behavior normal.          Last Recorded Vitals  Blood pressure 106/75, pulse 87, temperature 36.6 °C (97.9 °F), temperature source Temporal, resp. rate 18, weight 76.2 kg (167 lb 15.9 oz), SpO2 97%, not currently breastfeeding.    Relevant Results  Medications  Current Medications[2]    Labs  CBC  No results found for: \"WBC\", \"NRBC\", \"RBC\", \"HGB\", \"HCT\", \"MCV\", \"MCH\", \"MCHC\", \"RDW\", \"PLT\", \"MPV\"    CMP  No results found for: \"GLUCOSE\", \"NA\", \"K\", \"CL\", \"CO2\", \"ANIONGAP\", \"BUN\", \"CREATININE\", \"EGFR\", \"CALCIUM\", \"ALBUMIN\", \"ALKPHOS\", \"PROT\", \"AST\", \"BILITOT\", \"ALT\"    Coagulation   No results found for: \"PROTIME\", \"INR\", \"APTT\", \"FIBRINOGEN\"    Pregnancy Tests  No results found for: \"HCGQUANT\", \"HCGURINE\"    Imaging   No results found for this or any previous visit.         Assessment/Plan   Assessment & Plan  Umbilical hernia without obstruction and without gangrene      Desiring sterilization  Umbilical hernia    Laparoscopic tubal ligation  Repair of hernia       I spent  minutes in the professional and overall care of this patient.      Jesusita De La Torre MD      Assessment/Plan        Problem List       No episode was linked to this visit.            Subjective         Prenatal Provider     OB History    Para Term  AB Living   10 9 5 4 1 2   SAB IAB Ectopic Multiple Live Births   1 0 0 1 2      # Outcome Date GA Lbr Devante/2nd Weight Sex Type Anes PTL Lv   10 Term 25 39w0d 01:39 / 00:06 2.86 kg M Vag-Spont EPI  SHIV      Name: Gregorio Cyrus      Apgar1: 8  Apgar5: 9   9  12/15/23 36w1d  1.86 kg F CS-LTranv Spinal  SHIV      Name: CASTANONLITOMEL      Apgar1: 8  Apgar5: 9   8 Term 21    M  EPI     7A  10/05/19    M Vag-Spont EPI Y    7B      M  EPI     6 Term 18 " 38w0d   M Vag-Spont EPI N    5 SAB            4             3             2 Term            1 Term                Surgical History[3]    Social History     Tobacco Use    Smoking status: Never     Passive exposure: Never    Smokeless tobacco: Never   Substance Use Topics    Alcohol use: Not Currently       Allergies[4]    Prescriptions Prior to Admission[5]  Objective     Last Vitals  Temp Pulse Resp BP MAP O2 Sat   36.6 °C (97.9 °F) 87 18 106/75   97 %     Blood Pressures         2025  0654             BP: 106/75             Physical Exam  General: NAD, mood appropriate  Cardiopulmonary: warm and well perfused, breathing comfortably on room air  Abdomen: Gravid, non-tender  Extremities: Symmetric  Speculum Exam:   Cervix:   /  /      Chaperone Present:   Chaperone Name/Title:   Examination Chaperoned:      Fetal Monitoring  Baseline:  bpm, Variability: ,   and                                  [1]   Family History  Problem Relation Name Age of Onset    No Known Problems Mother      No Known Problems Father      No Known Problems Brother     [2]   Current Facility-Administered Medications:     ceFAZolin (Ancef) 2 g in dextrose (iso)  mL, 2 g, intravenous, Once, Krystal Brizuela MD  [3]   Past Surgical History:  Procedure Laterality Date     SECTION, LOW TRANSVERSE     [4]   Allergies  Allergen Reactions    Prednisone Psychosis    Prednisone Agitation and Other   [5]   Medications Prior to Admission   Medication Sig Dispense Refill Last Dose/Taking    acetaminophen (Tylenol) 325 mg tablet Take 3 tablets (975 mg) by mouth every 6 hours. 60 tablet 0 More than a month    chlorhexidine (Peridex) 0.12 % solution Use as directed. 473 mL 0 2025 Morning    mupirocin (Bactroban) 2 % ointment Apply topically 2 times a day for 10 days. 22 g 0

## 2025-06-05 NOTE — POST-PROCEDURE NOTE
Pt arrived to [facility] via cart with RN.  Cookies and water taken well. Ice bag sent home with pt  pt has margarette pad on with scant amt dark drainage

## 2025-06-05 NOTE — OP NOTE
Open Umbilical Hernia Repair with Mesh Operative Note     Date: 2025  OR Location: [facility]    Name: Rinku De, : 1995, Age: 30 y.o., MRN: [id number], Sex: female    Diagnosis  Pre-op Diagnosis      * Umbilical hernia without obstruction and without gangrene [K42.9] Post-op Diagnosis     * Umbilical hernia without obstruction and without gangrene [K42.9]     Procedures  Open Umbilical Hernia Repair with Mesh  19434 - [place] RPR AA HERNIA 1ST 3-10 CM NCRC8/STRANGULATED    Tubal Ligation  18399 - [place] LAPAROSCOPY FULGURATION OVIDUCTS      Surgeons   Panel 1:     * Krystal Brizuela - Primary  Panel 2:     * Jesusita De La Torre - Primary    Resident/Fellow/Other Assistant:  Surgeons and Role:  * No surgeons found with a matching role *    Staff:   Circulator: Caleb Caba Person: Gloria Reyes Circulator: Toni    Anesthesia Staff: Anesthesiologist: Pablo White DO  CRNA: WESTLEY Butts  Frontline Breaker: BARRY Flores    Procedure Summary  Anesthesia: General  ASA: II  Estimated Blood Loss: 2mL  Intra-op Medications:   Administrations occurring from 0730 to 0945 on 25:   Medication Name Total Dose   lidocaine-epinephrine (Xylocaine W/EPI) 1 %-1:100,000 injection 5 mL   BUPivacaine HCl (Marcaine) 0.5 % (5 mg/mL) injection 5 mL   dexAMETHasone (Decadron) 4 mg/mL IV Syringe 2 mL 8 mg   dexMEDETOMidine 4 mcg/mL in NS syringe 16 mcg   fentaNYL (Sublimaze) injection 50 mcg/mL 150 mcg   ketorolac (Toradol) injection 30 mg 30 mg   LR infusion Cannot be calculated   midazolam (Versed) injection 1 mg/mL 2 mg   ondansetron (Zofran) 2 mg/mL injection 4 mg   phenylephrine 100 mcg/mL syringe 10 mL (prefilled) 100 mcg   propofol (Diprivan) injection 10 mg/mL 200 mg   rocuronium (ZeMuron) 50 mg/5 mL injection 80 mg   ceFAZolin (Ancef) 2 g in dextrose (iso)  mL 4 g              Anesthesia Record               Intraprocedure I/O Totals          Intake    ceFAZolin (Ancef) 2 g in  dextrose (iso)  mL 200.00 mL    Total Intake 200 mL          Specimen:   ID Type Source Tests Collected by Time   1 : incarcerated hernia contents Tissue HERNIA SAC SURGICAL PATHOLOGY EXAM Krystal Brizuela MD 6/5/2025 0837   2 : bilateral fallopian tubes Tissue FALLOPIAN TUBE SALPINGECTOMY LEFT AND RIGHT SURGICAL PATHOLOGY EXAM Krystal Brizuela MD 6/5/2025 0852                 Drains and/or Catheters: * None in log *    Tourniquet Times:         Implants:  Implants       Type Name Action Serial No.      Surgical Mesh Sling Implant PATCH, HERNIA, VENTRALEX ST, MEDIUM, 2.5 X 2.5 - [id number] Implanted               Findings: Patient with a 3.2 cm umbilical defect with incarcerated preperitoneal fat excised and sent to pathology.  Repaired with a 6.4 cm mesh  Tubal ligation portion will be dictated per Dr. De La Torre  Indications: Rinku De is an 30 y.o. female who is having surgery for Umbilical hernia without obstruction and without gangrene [K42.9].     The patient was seen in the preoperative area. The risks, benefits, complications, treatment options, non-operative alternatives, expected recovery and outcomes were discussed with the patient. The possibilities of reaction to medication, pulmonary aspiration, injury to surrounding structures, bleeding, recurrent infection, the need for additional procedures, failure to diagnose a condition, and creating a complication requiring transfusion or operation were discussed with the patient. The patient concurred with the proposed plan, giving informed consent.  The site of surgery was properly noted/marked if necessary per policy. The patient has been actively warmed in preoperative area. Preoperative antibiotics have been ordered and given within 1 hours of incision. Venous thrombosis prophylaxis have been ordered including bilateral sequential compression devices    Procedure Details: Patient brought the room in the supine position.  General anesthesia was  obtained with ET intubation.  Patient with then placed in lithotomy position.  The abdomen and perineal area was prepped and draped in a sterile fashion.  Marking pen was used to delineate the line of incision inferior to the umbilicus in a semicircular fashion.  This measured approximately 4.5 cm.  1% lidocaine mixed with 25% Marcaine with epi is infiltrated the dermis.  15 blade scalpel was used to make the incision in the skin.  Subcutaneous tissue  electrocautery to the incarcerated hernia contents.  The dermis of the umbilicus was  from the incarcerated hernia sac down to the level of the fascia.  The incarcerated preperitoneal fat and hernia sac was excised and sent to pathology.  This revealed a 3.2 cm defect in the fascia.  2-0 Vicryl stay sutures were placed on the fascia a 1012 trocar was placed into the abdomen.  The balloon of the trocar was insufflated and the abdomen was insufflated to 15 mmHg.  Two 5 mm trocars were placed 1 in the right abdomen and 1 in the left abdomen this was done by first local anesthetizing the skin followed by separation of the skin 1 cm followed by placement of 5 mm trocar.  At this point I ran the camera while Dr. De La Torre performed the tubal ligation procedure.  After she completed her portion of the procedure we had excellent hemostasis within the abdomen.  At this point Dr. Harvey scrubbed out and I continued with the hernia repair  CO2 was evacuated abdominal cavity.  The 5 Barcenas trocars were removed under direct vision with no bleeding from the trocar sites.  Fascia at the umbilicus was cleaned off using lecture cautery  Prior to removing the 5 Barcenas trocars we examined the umbilical hernia from the inside.  The defect was just 1 defect measuring 3.2 cm.  After the fascia was cleaned off a 6.4 cm Ventralex ST hernia patch was chosen for the defect.  It was sewn in 4 quadrants with 0 PDS suture.  It was sewn in the other 4 quadrants with 0 Vicryl.   Additional 2-0 Vicryl's were placed to ensure there were no gaps.  The fascia was closed over the mesh using interrupted 0 Vicryl figure-of-eight sutures.  More local is infiltrated into the fascia.  The dermis of the umbilicus was tacked down with two 3-0 Prolene sutures.  Area was irrigated prior to tacking the umbilicus down.  Incision at the umbilicus was closed with interrupted 3-0 Vicryl followed by running 4-0 Monocryl.  5 Barcenas trocar sites were closed with interrupted 4-0 Vicryl Steri-Strips sterile dressing abdominal binder was applied patient Toller procedure well extubated in the operating room transferred to recovery with rails up all counts were good  Evidence of Infection: No   Complications:  None; patient tolerated the procedure well.    Disposition: PACU - hemodynamically stable.  Condition: stable             Task Performed by NEFTALI First Assist or Physician Assistant:   Ro LI/JULY, was necessary to assist on this case due to the nature of the case and difficulty. During the case she  served as my assist by adequate exposure      Additional Details:     Attending Attestation: I was present and scrubbed for the entire procedure.    Krystal Brizuela  Phone Number: [phone]

## 2025-06-05 NOTE — OP NOTE
Date: 2025  OR Location: [facility]    Name: Rinku De, : 1995, Age: 30 y.o., MRN: [id number], Sex: female    Diagnosis  Pre-op Diagnosis      * Umbilical hernia without obstruction and without gangrene [K42.9]    Desiring sterilization Post-op Diagnosis     * Umbilical hernia without obstruction and without gangrene [K42.9]      Same with left paratubal small cyst  Small omental adhesion to anterior peritoneum     Procedures  Open Umbilical Hernia Repair with Mesh  39931 - [place] RPR AA HERNIA 1ST 3-10 CM NCRC8/STRANGULATED    Tubal Ligation  92664 - [place] LAPAROSCOPY FULGURATION OVIDUCTS      Surgeons   Panel 1:     * Krystal Brizuela - Primary  Panel 2:     * Jesusita De La Torre - Primary    Resident/Fellow/Other Assistant:  Surgeons and Role:  * No surgeons found with a matching role *    Staff:   Circulator: Caleb Caba Person: Gloria Reyes Circulator: Toni    Anesthesia Staff: Anesthesiologist: Pablo White DO  CRNA: KYRA Butts-CRNA    Procedure Summary  Anesthesia: General  ASA: II  Estimated Blood Loss: 5mL  Intra-op Medications:   Administrations occurring from 0730 to 0945 on 25:   Medication Name Total Dose   lidocaine-epinephrine (Xylocaine W/EPI) 1 %-1:100,000 injection 5 mL   BUPivacaine HCl (Marcaine) 0.5 % (5 mg/mL) injection 5 mL   dexAMETHasone (Decadron) 4 mg/mL IV Syringe 2 mL 8 mg   dexMEDETOMidine 4 mcg/mL in NS syringe 16 mcg   fentaNYL (Sublimaze) injection 50 mcg/mL 150 mcg   LR infusion Cannot be calculated   midazolam (Versed) injection 1 mg/mL 2 mg   propofol (Diprivan) injection 10 mg/mL 200 mg   rocuronium (ZeMuron) 50 mg/5 mL injection 70 mg   ceFAZolin (Ancef) 2 g in dextrose (iso)  mL 4 g              Anesthesia Record               Intraprocedure I/O Totals          Intake    ceFAZolin (Ancef) 2 g in dextrose (iso)  mL 200.00 mL    Total Intake 200 mL          Specimen:   ID Type Source Tests Collected by Time   1 : incarcerated hernia  contents Tissue HERNIA SAC SURGICAL PATHOLOGY EXAM Krystal Brizuela MD 6/5/2025 0837   2 : bilateral fallopian tubes Tissue FALLOPIAN TUBE SALPINGECTOMY LEFT AND RIGHT SURGICAL PATHOLOGY EXAM Krystal Brizuela MD 6/5/2025 0852                 Procedure Details:  The patient was seen in the preoperative area. The site of surgery was properly noted/marked if necessary per policy. The patient has been actively warmed in preoperative area. Preoperative antibiotics have been ordered and given within 1 hours of incision. Venous thrombosis prophylaxis have been ordered including bilateral sequential compression devices    Findings: Umbilical hernia - refer to Dr Brizuela Note for detail. EGBUS- nl, Vagina - normal, cervix normal, mild pelvic relaxation.  Uterus normal size, fallopian tubes and ovaries normal except 1cm left paratubal cyst.  Laparoscopic bilateral partial salpingectomy performed and sent to pathology.  Hemostasis noted.  Mild omental adhesion to anterior peritoneum taken down with thunderbeat - hemostasis noted.    Patient was taken to the OR, Given GETA.  The patient was then placed in the modified dorsal lithotomy position in [facility].  The abdomen, vagina/vulva and inner thigh areas prepped in the usual fashion.  The bladder was straight cathed. The patient was then draped in the usual fashion.    Weighted vaginal speculum was placed in the vagina.  An single tooth tenaculum was placed on the anterior lip of the cervix and uterine manipulator placed without difficulties.  Single tooth tenaculum was removed as was the weighted vaginal speculum.    Attention was then made to the upper abdomen.  Please refer to Dr Brizuela's note.    After laparoscope was introduced at the umbilicus.  Lateral incisions were made and 5mm trocar and sheaths introduced under direct visualization.  Trocars removed. Omental adhesion taken down with thunderbeat.      Attention was then made to the patient's right side.   Fallopian tube grasped and partial salpingectomy performed with thunderbeat. The same was done on the left side with partial salpingectomy performed.  Hemostasis was noted.  The laparoscope camera was then moved to the right lateral port and the cut portions of the fallopian tubes were removed thru the umbilical port and sent to pathology.    Hemostasis again was noted.  Right lateral sheath removed.    Please refer to Dr Brizuela's note for remaining surgery.    Complications:  None; patient tolerated the procedure well.     Disposition: PACU - hemodynamically stable.  Condition: stable

## 2025-06-06 ENCOUNTER — TELEPHONE (OUTPATIENT)
Dept: SURGERY | Facility: CLINIC | Age: 30
End: 2025-06-06
Payer: COMMERCIAL

## 2025-06-06 ASSESSMENT — PAIN SCALES - GENERAL: PAINLEVEL_OUTOF10: 8

## 2025-06-06 NOTE — TELEPHONE ENCOUNTER
"Patient is S/P Open Umbilical Hernia Repair with Mesh and Tubal Ligation 6/5/2025.  Patient called office this morning reporting a tightness in her chest.  Patient stated that the tightness started yesterday, but was able to sleep through the night just fine.  Patient stated that she noticed the tightness again this morning when she woke up.  Patient stated that she is having some SOB when moving around, but the tightness is constant whether she is moving or sitting.  Patient denied any pain in her chest, stated that it just feels \"heavy\".  Patient denies any nausea, vomiting, or fever; reports some loose stools.  Patient states that she is eating and drinking normally.    Spoke with Dr. Brizuela about patient's symptoms.  Per Dr. Brizuela- it is most likely the gas used to inflate her abdomen yesterday for her surgery or it could be the mesh used to close the hernia making her abdomen sore and not wanting to take deep breaths.  Dr. Brizuela recommends that patient focus on deliberately taking deep breaths, that it will become easier every day to take the deep breaths and that the heaviness should subside over the next few days.  Dr. Brizuela stated that if the heaviness does not get better recommends that patient follow up with her PCP or go to the ED d/t Dr. Brizuela being out of the office for the next two weeks.    Relayed Dr. Brizuela's instructions and directions to patient.  Patient verbalized understanding and denied any other questions or concerns at this time.  "

## 2025-06-11 LAB
LABORATORY COMMENT REPORT: NORMAL
PATH REPORT.FINAL DX SPEC: NORMAL
PATH REPORT.GROSS SPEC: NORMAL
PATH REPORT.RELEVANT HX SPEC: NORMAL
PATH REPORT.TOTAL CANCER: NORMAL

## 2025-06-20 ENCOUNTER — APPOINTMENT (OUTPATIENT)
Dept: SURGERY | Facility: CLINIC | Age: 30
End: 2025-06-20
Payer: COMMERCIAL

## 2025-06-23 PROBLEM — O26.90 DISEASE OF PREGNANCY (HHS-HCC): Status: ACTIVE | Noted: 2023-12-15

## 2025-06-23 PROBLEM — O99.019 ANTEPARTUM ANEMIA: Status: ACTIVE | Noted: 2025-06-23

## 2025-06-23 NOTE — PROGRESS NOTES
"Subjective   Patient ID: Rinku De is a 30 y.o. female who presents for post-op open umbilical hernia repair with mesh on 2025.    HPI  Patient returns to the clinic for a following up after her open umbilical hernia repair with mesh on 2025.     Findings: Patient with a 3.2 cm umbilical defect with incarcerated preperitoneal fat excised and sent to pathology.  Repaired with a 6.4 cm mesh  Tubal ligation portion will be dictated per Dr. Lockhart  FINAL DIAGNOSIS   A. HERNIA SAC, EXCISION:  -- FIBROADIPOSE TISSUE WITH FOCAL REACTIVE CHANGES, CONSISTENT WITH HERNIA SAC.     B. RIGHT AND LEFT FALLOPIAN TUBES, BILATERAL SALPINGECTOMY:  -- RIGHT AND LEFT FALLOPIAN TUBES WITH NO SIGNIFICANT PATHOLOGIC FINDINGS.  -- COMPLETE CROSS SECTIONS IDENTIFIED.      Social History:  Tobacco Use - NO  Do you use any recreational drugs - NO  Alcohol Use - NO  Caffeine Intake - YES  Working - FULL TIME   Marital status - SINGLE             Living with - SIGNIFICANT OTHER AND 6 CHILDREN     Past Medical History:   Diagnosis Date    Anemia     Ulcerative colitis     Umbilical hernia      Past Surgical History:   Procedure Laterality Date     SECTION, LOW TRANSVERSE      TUBAL LIGATION Bilateral 2025    dr lockhart    UMBILICAL HERNIA REPAIR N/A 2025    open - dr sales     Family History   Problem Relation Name Age of Onset    No Known Problems Mother      No Known Problems Father      No Known Problems Brother       Allergies   Allergen Reactions    Prednisone Psychosis    Prednisone Agitation and Other       Review of Systems  /77 (BP Location: Right arm, Patient Position: Sitting, BP Cuff Size: Adult)   Pulse 63   Temp 36.6 °C (97.8 °F) (Temporal)   Resp 17   Ht 1.651 m (5' 5\")   Wt 75.8 kg (167 lb)   SpO2 100%   Breastfeeding Unknown   BMI 27.79 kg/m²     Objective   Physical Exam  Incisions clean dry and intact.  Hernia repair intact  Assessment/Plan   Problem List Items " Addressed This Visit           ICD-10-CM    Umbilical hernia without obstruction and without gangrene - Primary K42.9    Status post umbilical hernia repair with 6.4 cm mesh.  Excellent healing.  Patient needs to follow-up as needed                 Mary Ellen Ornelas MA 06/27/25 9:33 AM

## 2025-06-27 ENCOUNTER — OFFICE VISIT (OUTPATIENT)
Dept: SURGERY | Facility: CLINIC | Age: 30
End: 2025-06-27
Payer: COMMERCIAL

## 2025-06-27 VITALS
RESPIRATION RATE: 17 BRPM | SYSTOLIC BLOOD PRESSURE: 106 MMHG | DIASTOLIC BLOOD PRESSURE: 77 MMHG | BODY MASS INDEX: 27.82 KG/M2 | TEMPERATURE: 97.8 F | HEIGHT: 65 IN | HEART RATE: 63 BPM | OXYGEN SATURATION: 100 % | WEIGHT: 167 LBS

## 2025-06-27 DIAGNOSIS — K42.9 UMBILICAL HERNIA WITHOUT OBSTRUCTION AND WITHOUT GANGRENE: Primary | ICD-10-CM

## 2025-06-27 PROCEDURE — 99211 OFF/OP EST MAY X REQ PHY/QHP: CPT | Performed by: SURGERY

## 2025-06-27 PROCEDURE — 1036F TOBACCO NON-USER: CPT | Performed by: SURGERY

## 2025-06-27 PROCEDURE — 3008F BODY MASS INDEX DOCD: CPT | Performed by: SURGERY

## 2025-06-27 ASSESSMENT — ENCOUNTER SYMPTOMS
OCCASIONAL FEELINGS OF UNSTEADINESS: 0
LOSS OF SENSATION IN FEET: 0
DEPRESSION: 0

## 2025-06-27 ASSESSMENT — LIFESTYLE VARIABLES
HOW MANY STANDARD DRINKS CONTAINING ALCOHOL DO YOU HAVE ON A TYPICAL DAY: PATIENT DOES NOT DRINK
HOW OFTEN DO YOU HAVE SIX OR MORE DRINKS ON ONE OCCASION: NEVER
HOW OFTEN DO YOU HAVE A DRINK CONTAINING ALCOHOL: NEVER
AUDIT-C TOTAL SCORE: 0
SKIP TO QUESTIONS 9-10: 1

## 2025-06-27 ASSESSMENT — PATIENT HEALTH QUESTIONNAIRE - PHQ9
1. LITTLE INTEREST OR PLEASURE IN DOING THINGS: NOT AT ALL
SUM OF ALL RESPONSES TO PHQ9 QUESTIONS 1 & 2: 0
2. FEELING DOWN, DEPRESSED OR HOPELESS: NOT AT ALL

## 2025-06-27 ASSESSMENT — PAIN SCALES - GENERAL: PAINLEVEL_OUTOF10: 0-NO PAIN

## (undated) DEVICE — BLADE, SURGICAL, POLYMER COATED P15, STERILE, DISP

## (undated) DEVICE — SLEEVE, VASO PRESS, CALF GARMENT, MEDIUM, GREEN

## (undated) DEVICE — STRIP, SKIN CLOSURE, COMPOUND BENZION TINCTURE 0.6ML

## (undated) DEVICE — Device

## (undated) DEVICE — GLOVE, SURGICAL, PROTEXIS PI , 6.5, PF, LF

## (undated) DEVICE — NEEDLE, INSUFFLATION, 13GAX120MM, DISP

## (undated) DEVICE — SUTURE, VICRYL, 3-0, 27 IN, SH

## (undated) DEVICE — STRIP, SKIN CLOSURE, STERI STRIP, REINFORCED, 0.5 X 4 IN

## (undated) DEVICE — SUTURE, MONOCRYL, 4-0, 27 IN, PS-2, UNDYED

## (undated) DEVICE — POSITIONING SYSTEM, PAGAZZI, PATIENT

## (undated) DEVICE — PREP TRAY, VAGINAL

## (undated) DEVICE — SUTURE, PROLENE, 3-0, 30 IN, SH

## (undated) DEVICE — SUTURE, VICRYL, 3-0, 54 IN, CTD, UNDYED

## (undated) DEVICE — THUNDERBEAT, 5MM, 35CM, FRONT ACTUATED

## (undated) DEVICE — SUTURE, VICRYL, 0, 36 IN, CT-1, UNDYED

## (undated) DEVICE — BINDER, ABDOMINAL, SMALL/MEDIUM, 12 X 30-45 IN

## (undated) DEVICE — SOLUTION, IRRIGATION, X RX SODIUM CHL 0.9%, 1000ML BTL

## (undated) DEVICE — ELECTRODE, ELECTROSURGICAL, BLADE, INSULATED, ENT/IMA, STERILE

## (undated) DEVICE — CATHETER, URETHRAL, ALL PURPOSE, 16FR

## (undated) DEVICE — TUBE SET, PNEUMOCLEAR, SMOKE EVACU, HIGH-FLOW

## (undated) DEVICE — TUBING, SUCTION, 6MM X 10, CLEAN N-COND

## (undated) DEVICE — SUTURE, VICRYL PLUS, 0, 27IN, UR-6, VIOLET, BRAIDED

## (undated) DEVICE — SUTURE, PDS II, 0 36 IN, CT-1, VIOLET